# Patient Record
Sex: MALE | Race: WHITE | NOT HISPANIC OR LATINO | Employment: OTHER | URBAN - NONMETROPOLITAN AREA
[De-identification: names, ages, dates, MRNs, and addresses within clinical notes are randomized per-mention and may not be internally consistent; named-entity substitution may affect disease eponyms.]

---

## 2018-01-18 ENCOUNTER — OFFICE VISIT (OUTPATIENT)
Dept: SURGERY | Facility: CLINIC | Age: 68
End: 2018-01-18

## 2018-01-18 VITALS
DIASTOLIC BLOOD PRESSURE: 70 MMHG | BODY MASS INDEX: 34.56 KG/M2 | HEIGHT: 68 IN | WEIGHT: 228 LBS | HEART RATE: 68 BPM | SYSTOLIC BLOOD PRESSURE: 120 MMHG | OXYGEN SATURATION: 98 % | TEMPERATURE: 98.7 F

## 2018-01-18 DIAGNOSIS — R15.9 INCONTINENCE OF FECES, UNSPECIFIED FECAL INCONTINENCE TYPE: Primary | ICD-10-CM

## 2018-01-18 DIAGNOSIS — K64.9 HEMORRHOIDS, UNSPECIFIED HEMORRHOID TYPE: ICD-10-CM

## 2018-01-18 DIAGNOSIS — Z86.010 HX OF COLONIC POLYP: ICD-10-CM

## 2018-01-18 PROCEDURE — 99213 OFFICE O/P EST LOW 20 MIN: CPT | Performed by: SURGERY

## 2018-01-18 RX ORDER — RANITIDINE 150 MG/1
150 TABLET ORAL 2 TIMES DAILY PRN
Refills: 0 | COMMUNITY
Start: 2018-01-15 | End: 2020-06-17 | Stop reason: HOSPADM

## 2018-01-18 RX ORDER — LANSOPRAZOLE 15 MG/1
15 CAPSULE, DELAYED RELEASE ORAL DAILY
Refills: 0 | COMMUNITY
Start: 2017-10-26 | End: 2020-06-17 | Stop reason: HOSPADM

## 2018-01-18 RX ORDER — LISINOPRIL 20 MG/1
20 TABLET ORAL DAILY
Refills: 0 | COMMUNITY
Start: 2017-12-03

## 2018-01-18 RX ORDER — GLIPIZIDE 5 MG/1
2.5 TABLET, FILM COATED, EXTENDED RELEASE ORAL 2 TIMES DAILY
Refills: 0 | COMMUNITY
Start: 2017-10-25

## 2018-01-18 RX ORDER — ATORVASTATIN CALCIUM 40 MG/1
40 TABLET, FILM COATED ORAL DAILY
Refills: 0 | COMMUNITY
Start: 2017-10-25

## 2018-01-18 RX ORDER — NICOTINE POLACRILEX 2 MG/1
1 LOZENGE ORAL DAILY
Refills: 0 | COMMUNITY
Start: 2017-12-03 | End: 2022-04-18 | Stop reason: HOSPADM

## 2018-01-18 NOTE — PROGRESS NOTES
Patient: Miguelangel Lin    YOB: 1950    Date: 01/18/2018    Primary Care Provider: Belinda Bullock MD    Chief complaint:   Chief Complaint   Patient presents with   • FHx Colon Cancer     colonoscopy       Subjective .     History of present illness:  I saw the patient in the office today as a consultation for a hx of polyps.  Patient had a colonoscopy 12/2014, pathology showed x2 hyperplastic polyps. He complains of hemorrhoids and occasional fecal incontinence. He denies a fm hx of colon cancer or changes in bowel habits. He has never had a heart attack or a stroke.     The following portions of the patient's history were reviewed and updated as appropriate: allergies, current medications, past family history, past medical history, past social history, past surgical history and problem list.      Review of Systems   Constitutional: Negative for chills, diaphoresis, fatigue and fever.   HENT: Negative for dental problem, drooling, ear discharge and hearing loss.    Respiratory: Negative for cough, choking, chest tightness and shortness of breath.    Cardiovascular: Negative for chest pain, palpitations and leg swelling.   Gastrointestinal: Positive for rectal pain. Negative for blood in stool.   Endocrine: Negative for cold intolerance and heat intolerance.   Genitourinary: Negative for flank pain, frequency and hematuria.   Neurological: Negative for seizures, light-headedness, numbness and headaches.   Psychiatric/Behavioral: Negative for agitation, behavioral problems and confusion.       History:  Past Medical History:   Diagnosis Date   • Diabetes mellitus    • Hypertension        Past Surgical History:   Procedure Laterality Date   • HERNIA REPAIR         Family History   Problem Relation Age of Onset   • No Known Problems Mother    • No Known Problems Father    • No Known Problems Sister    • No Known Problems Brother        Social History   Substance Use Topics   • Smoking status:  Former Smoker   • Smokeless tobacco: Never Used   • Alcohol use No       Allergies:  No Known Allergies    Medications:    Current Outpatient Prescriptions:   •  atorvastatin (LIPITOR) 40 MG tablet, take 1 tablet by oral route  every day, Disp: , Rfl: 0  •  glipiZIDE (GLUCOTROL XL) 10 MG 24 hr tablet, take 1 tablet by mouth twice a day, Disp: , Rfl: 0  •  lansoprazole (PREVACID) 15 MG capsule, , Disp: , Rfl: 0  •  lisinopril (PRINIVIL,ZESTRIL) 20 MG tablet, , Disp: , Rfl: 0  •  metFORMIN (GLUCOPHAGE) 1000 MG tablet, take 1 tablet by mouth twice a day, Disp: , Rfl: 0  •  RA LORATA-D  MG per 24 hr tablet, , Disp: , Rfl: 0  •  raNITIdine (ZANTAC) 150 MG tablet, take 1 tablet by mouth twice a day if needed, Disp: , Rfl: 0    Objective     Vital Signs:   Temp:  [98.7 °F (37.1 °C)] 98.7 °F (37.1 °C)  Heart Rate:  [68] 68  BP: (120)/(70) 120/70    Physical Exam:   General Appearance:    Alert, cooperative, in no acute distress   Head:    Normocephalic, without obvious abnormality, atraumatic   Eyes:            Lids and lashes normal, conjunctivae and sclerae normal, no   icterus, no pallor, corneas clear, PERRL   Ears:    Ears appear intact with no abnormalities noted   Throat:   No oral lesions, no thrush, oral mucosa moist   Neck:   No adenopathy, supple, trachea midline, no thyromegaly,  no JVD   Lungs:     Clear to auscultation,respirations regular, even and                  unlabored    Heart:    Regular rhythm and normal rate, normal S1 and S2, no            murmur   Abdomen:     no masses, no organomegaly, soft non-tender, non-distended, no guarding, there is no evidence of tenderness   Extremities:   Moves all extremities well, no edema, no cyanosis, no             redness   Pulses:   Pulses palpable and equal bilaterally   Skin:   No bleeding, bruising or rash   Lymph nodes:   No palpable adenopathy   Neurologic:   Cranial nerves 2 - 12 grossly intact, sensation intact      Results Review:   I reviewed the  patient's new clinical results.  I reviewed the patient's new imaging results and agree with the interpretation.    Review of Systems was reviewed and confirmed as accurate today.    Assessment/Plan :    1. Incontinence of feces, unspecified fecal incontinence type    2. Hemorrhoids, unspecified hemorrhoid type    3. Hx of colonic polyp        I recommend a colonoscopy for further evaluation. The procedure was explained as well as the risks which include but are not limited to bleeding, infection, perforation, abdominal pain etc. The patient understands these risks and the procedure and wishes to proceed.     Electronically signed by Victoriano Lerma MD  01/18/18 1:23 PM    Scribed for Victoriano Lerma MD by Shireen Hernandez. 1/18/2018  1:39 PM

## 2018-02-01 ENCOUNTER — OUTSIDE FACILITY SERVICE (OUTPATIENT)
Dept: SURGERY | Facility: CLINIC | Age: 68
End: 2018-02-01

## 2018-02-01 PROCEDURE — 45384 COLONOSCOPY W/LESION REMOVAL: CPT | Performed by: SURGERY

## 2018-02-15 ENCOUNTER — OFFICE VISIT (OUTPATIENT)
Dept: SURGERY | Facility: CLINIC | Age: 68
End: 2018-02-15

## 2018-02-15 VITALS
DIASTOLIC BLOOD PRESSURE: 70 MMHG | HEART RATE: 70 BPM | SYSTOLIC BLOOD PRESSURE: 128 MMHG | TEMPERATURE: 98.4 F | OXYGEN SATURATION: 99 % | BODY MASS INDEX: 34.56 KG/M2 | HEIGHT: 68 IN | WEIGHT: 228 LBS

## 2018-02-15 DIAGNOSIS — R15.9 INCONTINENCE OF FECES, UNSPECIFIED FECAL INCONTINENCE TYPE: ICD-10-CM

## 2018-02-15 DIAGNOSIS — K63.5 HYPERPLASTIC COLONIC POLYP, UNSPECIFIED PART OF COLON: Primary | ICD-10-CM

## 2018-02-15 PROCEDURE — 99213 OFFICE O/P EST LOW 20 MIN: CPT | Performed by: SURGERY

## 2018-02-15 NOTE — PROGRESS NOTES
Patient: Miguelangel Lin    YOB: 1950    Date: 02/15/2018    Primary Care Provider: Belinda Bullock MD    Reason for Consultation: Follow-up colonoscopy    Chief Complaint:   Chief Complaint   Patient presents with   • Follow-up     follow up colon       History of present illness:  I saw the patient in the office today as a followup from their recent colonoscopy with polypectomy, the pathology report did show hyperplastic polyps. Patient continues to complain of fecal incontinence.       The following portions of the patient's history were reviewed and updated as appropriate: allergies, current medications, past family history, past medical history, past social history, past surgical history and problem list.      Review of Systems    Allergies:  No Known Allergies    Medications:    Current Outpatient Prescriptions:   •  atorvastatin (LIPITOR) 40 MG tablet, take 1 tablet by oral route  every day, Disp: , Rfl: 0  •  glipiZIDE (GLUCOTROL XL) 10 MG 24 hr tablet, take 1 tablet by mouth twice a day, Disp: , Rfl: 0  •  lansoprazole (PREVACID) 15 MG capsule, , Disp: , Rfl: 0  •  lisinopril (PRINIVIL,ZESTRIL) 20 MG tablet, , Disp: , Rfl: 0  •  metFORMIN (GLUCOPHAGE) 1000 MG tablet, take 1 tablet by mouth twice a day, Disp: , Rfl: 0  •  RA LORATA-D  MG per 24 hr tablet, , Disp: , Rfl: 0  •  raNITIdine (ZANTAC) 150 MG tablet, take 1 tablet by mouth twice a day if needed, Disp: , Rfl: 0    Vital Signs:  Temp:  [98.4 °F (36.9 °C)] 98.4 °F (36.9 °C)  Heart Rate:  [70] 70  BP: (128)/(70) 128/70    Physical Exam:   General Appearance:    Alert, cooperative, in no acute distress   Abdomen:     no masses, no organomegaly, soft non-tender, non-distended, no guarding, wounds are well healed, no evidence of recurrent hernia   Chest:      Clear to ausculation            Cor:     Regular rate and rhythm    Results Review:   I reviewed the patient's new clinical results.  I reviewed the patient's new imaging  results and agree with the interpretation.    Assessment / Plan:    1. Hyperplastic colonic polyp, unspecified part of colon    2. Incontinence of feces, unspecified fecal incontinence type        I did discuss the situation with the patient today in the office and they have done well from their recent colonoscopy with polypectomy.  I have released the patient back to normal activity.  I need to see the patient back in the office in 5 years and they will need to have repeat colonoscopy at that time. I have continued to recommend fiber, this seems to have helped his bowel movements some.    Electronically signed by Victoriano Lerma MD  02/15/18    Scribed for Victoriano Lerma MD by Shireen Hernandez. 2/15/2018  3:24 PM

## 2018-02-15 NOTE — PROGRESS NOTES
Patient: Miguelangel Lin    YOB: 1950    Date: 02/15/2018    Primary Care Provider: Belinda Bullock MD    Chief complaint:   Chief Complaint   Patient presents with   • Follow-up     follow up colon       Subjective .     History of present illness:  I saw the patient in the office today as a consultation for evaluation and treatment of ***    The following portions of the patient's history were reviewed and updated as appropriate: allergies, current medications, past family history, past medical history, past social history, past surgical history and problem list.      Review of Systems    History:  Past Medical History:   Diagnosis Date   • Diabetes mellitus    • Hypertension        Past Surgical History:   Procedure Laterality Date   • HERNIA REPAIR         Family History   Problem Relation Age of Onset   • No Known Problems Mother    • No Known Problems Father    • No Known Problems Sister    • No Known Problems Brother        Social History   Substance Use Topics   • Smoking status: Former Smoker   • Smokeless tobacco: Never Used   • Alcohol use No       Allergies:  No Known Allergies    Medications:    Current Outpatient Prescriptions:   •  atorvastatin (LIPITOR) 40 MG tablet, take 1 tablet by oral route  every day, Disp: , Rfl: 0  •  glipiZIDE (GLUCOTROL XL) 10 MG 24 hr tablet, take 1 tablet by mouth twice a day, Disp: , Rfl: 0  •  lansoprazole (PREVACID) 15 MG capsule, , Disp: , Rfl: 0  •  lisinopril (PRINIVIL,ZESTRIL) 20 MG tablet, , Disp: , Rfl: 0  •  metFORMIN (GLUCOPHAGE) 1000 MG tablet, take 1 tablet by mouth twice a day, Disp: , Rfl: 0  •  RA LORATA-D  MG per 24 hr tablet, , Disp: , Rfl: 0  •  raNITIdine (ZANTAC) 150 MG tablet, take 1 tablet by mouth twice a day if needed, Disp: , Rfl: 0    Objective     Vital Signs:   Temp:  [98.4 °F (36.9 °C)] 98.4 °F (36.9 °C)  Heart Rate:  [70] 70  BP: (128)/(70) 128/70    Physical Exam:   General Appearance:    Alert, cooperative, in no  "acute distress   Head:    Normocephalic, without obvious abnormality, atraumatic   Eyes:            Lids and lashes normal, conjunctivae and sclerae normal, no   icterus, no pallor, corneas clear, PERRL   Ears:    Ears appear intact with no abnormalities noted   Throat:   No oral lesions, no thrush, oral mucosa moist   Neck:   No adenopathy, supple, trachea midline, no thyromegaly,  no JVD   Lungs:     Clear to auscultation,respirations regular, even and                  unlabored    Heart:    Regular rhythm and normal rate, normal S1 and S2, no            murmur   Abdomen:     no masses, no organomegaly, soft non-tender, non-distended, no guarding, there is no evidence of tenderness   Extremities:   Moves all extremities well, no edema, no cyanosis, no             redness   Pulses:   Pulses palpable and equal bilaterally   Skin:   No bleeding, bruising or rash   Lymph nodes:   No palpable adenopathy   Neurologic:   Cranial nerves 2 - 12 grossly intact, sensation intact      Results Review:   {Results Review:03610::\"I reviewed the patient's new clinical results.\"}    Review of Systems was reviewed and confirmed as accurate today.    Assessment/Plan :    No diagnosis found.    I recommend a colonoscopy for further evaluation. The procedure was explained as well as the risks which include but are not limited to bleeding, infection, perforation, abdominal pain etc. The patient understands these risks and the procedure and wishes to proceed.     Electronically signed by Shireen Hernandez MA  02/15/18 3:02 PM    "

## 2020-03-12 ENCOUNTER — OFFICE VISIT (OUTPATIENT)
Dept: SURGERY | Facility: CLINIC | Age: 70
End: 2020-03-12

## 2020-03-12 VITALS
WEIGHT: 228.4 LBS | SYSTOLIC BLOOD PRESSURE: 138 MMHG | BODY MASS INDEX: 32.7 KG/M2 | HEIGHT: 70 IN | DIASTOLIC BLOOD PRESSURE: 94 MMHG | OXYGEN SATURATION: 98 % | HEART RATE: 84 BPM | TEMPERATURE: 98.5 F

## 2020-03-12 DIAGNOSIS — K63.5 POLYP OF COLON, UNSPECIFIED PART OF COLON, UNSPECIFIED TYPE: Primary | ICD-10-CM

## 2020-03-12 PROCEDURE — 99213 OFFICE O/P EST LOW 20 MIN: CPT | Performed by: SURGERY

## 2020-03-12 RX ORDER — POLYETHYLENE GLYCOL 3350 17 G/17G
238 POWDER, FOR SOLUTION ORAL ONCE
Qty: 1 EACH | Refills: 1 | Status: SHIPPED | OUTPATIENT
Start: 2020-03-12 | End: 2020-03-12

## 2020-03-12 RX ORDER — BISACODYL 5 MG/1
TABLET, DELAYED RELEASE ORAL
Qty: 4 TABLET | Refills: 0 | Status: SHIPPED | OUTPATIENT
Start: 2020-03-12

## 2020-03-12 NOTE — PROGRESS NOTES
Patient: Miguelangel Lin    YOB: 1950    Date: 03/12/2020    Primary Care Provider: Belinda Bullock MD    Chief Complaint   Patient presents with   • Colon Polyps       SUBJECTIVE:    History of present illness:  Patient has a history of MI and a history significant for hyperplastic colon polyps in the remote past. I saw the patient in the office today as a consultation for follow-up screening colonoscopy.  Patient denies changes in bowel habits, abdominal pain or rectal bleeding.  He does have a history significant for colon polyps.    The following portions of the patient's history were reviewed and updated as appropriate: allergies, current medications, past family history, past medical history, past social history, past surgical history and problem list.    Review of Systems   Constitutional: Negative for chills, fever and unexpected weight change.   HENT: Negative for trouble swallowing and voice change.    Eyes: Negative for visual disturbance.   Respiratory: Negative for apnea, cough, chest tightness, shortness of breath and wheezing.    Cardiovascular: Negative for chest pain, palpitations and leg swelling.   Gastrointestinal: Negative for abdominal distention, abdominal pain, anal bleeding, blood in stool, constipation, diarrhea, nausea, rectal pain and vomiting.   Endocrine: Negative for cold intolerance and heat intolerance.   Genitourinary: Negative for difficulty urinating, dysuria, flank pain, scrotal swelling and testicular pain.   Musculoskeletal: Negative for back pain, gait problem and joint swelling.   Skin: Negative for color change, rash and wound.   Neurological: Negative for dizziness, syncope, speech difficulty, weakness, numbness and headaches.   Hematological: Negative for adenopathy. Does not bruise/bleed easily.   Psychiatric/Behavioral: Negative for confusion. The patient is not nervous/anxious.        History:  Past Medical History:   Diagnosis Date   • Diabetes  "mellitus (CMS/HCC)    • Hypertension        Past Surgical History:   Procedure Laterality Date   • HERNIA REPAIR         Family History   Problem Relation Age of Onset   • No Known Problems Mother    • No Known Problems Father    • No Known Problems Sister    • No Known Problems Brother        Social History     Tobacco Use   • Smoking status: Former Smoker   • Smokeless tobacco: Never Used   Substance Use Topics   • Alcohol use: No   • Drug use: No       Allergies:  No Known Allergies    Medications:    Current Outpatient Medications:   •  atorvastatin (LIPITOR) 40 MG tablet, take 1 tablet by oral route  every day, Disp: , Rfl: 0  •  bisacodyl (DULCOLAX) 5 MG EC tablet, Take 2 @ 3pm, 2 @ 7 pm day prior to colonoscopy, Disp: 4 tablet, Rfl: 0  •  glipiZIDE (GLUCOTROL XL) 10 MG 24 hr tablet, take 1 tablet by mouth twice a day, Disp: , Rfl: 0  •  lansoprazole (PREVACID) 15 MG capsule, , Disp: , Rfl: 0  •  lisinopril (PRINIVIL,ZESTRIL) 20 MG tablet, , Disp: , Rfl: 0  •  metFORMIN (GLUCOPHAGE) 1000 MG tablet, take 1 tablet by mouth twice a day, Disp: , Rfl: 0  •  polyethylene glycol (GLYCOLAX) powder, Take 238 g by mouth 1 (One) Time for 1 dose. Mix with 64 ounces of clear liquid for bowel prep., Disp: 1 each, Rfl: 1  •  RA LORATA-D  MG per 24 hr tablet, , Disp: , Rfl: 0  •  raNITIdine (ZANTAC) 150 MG tablet, take 1 tablet by mouth twice a day if needed, Disp: , Rfl: 0    OBJECTIVE:    Vital Signs:   Vitals:    03/12/20 1421   BP: 138/94   Pulse: 84   Temp: 98.5 °F (36.9 °C)   SpO2: 98%   Weight: 104 kg (228 lb 6.4 oz)   Height: 177.8 cm (70\")       Physical Exam:   General Appearance:    Alert, cooperative, in no acute distress   Head:    Normocephalic, without obvious abnormality, atraumatic   Eyes:            Lids and lashes normal, conjunctivae and sclerae normal, no   icterus, no pallor, corneas clear, PERRL   Ears:    Ears appear intact with no abnormalities noted   Throat:   No oral lesions, no thrush, oral " mucosa moist   Neck:   No adenopathy, supple, trachea midline, no thyromegaly,  no JVD   Lungs:     Clear to auscultation,respirations regular, even and                  unlabored    Heart:    Regular rhythm and normal rate, normal S1 and S2, no            murmur   Abdomen:     no masses, no organomegaly, soft non-tender, non-distended, no guarding, there is no evidence of tenderness, no peritoneal signs   Extremities:   Moves all extremities well, no edema, no cyanosis, no             redness   Pulses:   Pulses palpable and equal bilaterally   Skin:   No bleeding, bruising or rash   Lymph nodes:   No palpable adenopathy   Neurologic:   Cranial nerves 2 - 12 grossly intact, sensation intact      Results Review:   I reviewed the patient's new clinical results.  I reviewed the patient's new imaging results and agree with the interpretation.  I reviewed the patient's other test results and agree with the interpretation    Review of Systems was reviewed and confirmed as accurate as documented by the MA.    ASSESSMENT/PLAN:    1. Polyp of colon, unspecified part of colon, unspecified type        I recommend a colonoscopy for further evaluation. The procedure was explained as well as the risks which include but are not limited to bleeding, infection, perforation, abdominal pain etc. The patient understands these risks and the procedure and wishes to proceed.     Electronically signed by Victoriano Lerma MD  03/12/20 9:12 AM  Portions of this note have been scribed for Victoriano Lerma MD by Shireen Hernandez. 3/12/2020  15:10

## 2020-03-20 ENCOUNTER — TELEPHONE (OUTPATIENT)
Dept: SURGERY | Facility: CLINIC | Age: 70
End: 2020-03-20

## 2020-04-28 PROBLEM — K63.5 POLYP OF COLON: Status: ACTIVE | Noted: 2020-04-28

## 2020-05-29 DIAGNOSIS — Z01.818 PRE-OP TESTING: Primary | ICD-10-CM

## 2020-06-15 ENCOUNTER — LAB (OUTPATIENT)
Dept: LAB | Facility: HOSPITAL | Age: 70
End: 2020-06-15

## 2020-06-15 DIAGNOSIS — Z01.818 PRE-OP TESTING: ICD-10-CM

## 2020-06-15 PROCEDURE — C9803 HOPD COVID-19 SPEC COLLECT: HCPCS

## 2020-06-15 PROCEDURE — U0002 COVID-19 LAB TEST NON-CDC: HCPCS

## 2020-06-15 PROCEDURE — U0004 COV-19 TEST NON-CDC HGH THRU: HCPCS

## 2020-06-15 RX ORDER — OMEPRAZOLE 20 MG/1
20 CAPSULE, DELAYED RELEASE ORAL DAILY
COMMUNITY

## 2020-06-15 RX ORDER — IPRATROPIUM BROMIDE 42 UG/1
2 SPRAY, METERED NASAL 2 TIMES DAILY
COMMUNITY

## 2020-06-15 RX ORDER — LORATADINE 10 MG/1
10 TABLET ORAL DAILY
COMMUNITY
End: 2022-04-04 | Stop reason: HOSPADM

## 2020-06-15 RX ORDER — NEOMYCIN SULFATE, POLYMYXIN B SULFATE, HYDROCORTISONE 3.5; 10000; 1 MG/ML; [USP'U]/ML; MG/ML
4 SOLUTION/ DROPS AURICULAR (OTIC) DAILY PRN
COMMUNITY
End: 2022-04-04 | Stop reason: HOSPADM

## 2020-06-15 RX ORDER — ASPIRIN 81 MG/1
81 TABLET ORAL 2 TIMES DAILY
COMMUNITY

## 2020-06-15 RX ORDER — AMOXICILLIN 500 MG
1200 CAPSULE ORAL DAILY
COMMUNITY

## 2020-06-15 RX ORDER — LANOLIN ALCOHOL/MO/W.PET/CERES
400 CREAM (GRAM) TOPICAL DAILY
COMMUNITY

## 2020-06-16 LAB
REF LAB TEST METHOD: NORMAL
SARS-COV-2 RNA RESP QL NAA+PROBE: NOT DETECTED

## 2020-06-17 ENCOUNTER — ANESTHESIA EVENT (OUTPATIENT)
Dept: GASTROENTEROLOGY | Facility: HOSPITAL | Age: 70
End: 2020-06-17

## 2020-06-17 ENCOUNTER — ANESTHESIA (OUTPATIENT)
Dept: GASTROENTEROLOGY | Facility: HOSPITAL | Age: 70
End: 2020-06-17

## 2020-06-17 ENCOUNTER — HOSPITAL ENCOUNTER (OUTPATIENT)
Facility: HOSPITAL | Age: 70
Setting detail: HOSPITAL OUTPATIENT SURGERY
Discharge: HOME | End: 2020-06-17
Attending: SURGERY | Admitting: SURGERY

## 2020-06-17 VITALS
OXYGEN SATURATION: 97 % | BODY MASS INDEX: 32.64 KG/M2 | HEART RATE: 80 BPM | DIASTOLIC BLOOD PRESSURE: 78 MMHG | RESPIRATION RATE: 20 BRPM | SYSTOLIC BLOOD PRESSURE: 102 MMHG | WEIGHT: 228 LBS | HEIGHT: 70 IN | TEMPERATURE: 97.6 F

## 2020-06-17 DIAGNOSIS — K63.5 POLYP OF COLON, UNSPECIFIED PART OF COLON, UNSPECIFIED TYPE: ICD-10-CM

## 2020-06-17 LAB — GLUCOSE BLDC GLUCOMTR-MCNC: 142 MG/DL (ref 70–130)

## 2020-06-17 PROCEDURE — 82962 GLUCOSE BLOOD TEST: CPT

## 2020-06-17 PROCEDURE — S0260 H&P FOR SURGERY: HCPCS | Performed by: SURGERY

## 2020-06-17 PROCEDURE — 25010000002 PROPOFOL 10 MG/ML EMULSION: Performed by: NURSE ANESTHETIST, CERTIFIED REGISTERED

## 2020-06-17 PROCEDURE — 88305 TISSUE EXAM BY PATHOLOGIST: CPT | Performed by: SURGERY

## 2020-06-17 RX ORDER — SODIUM CHLORIDE 0.9 % (FLUSH) 0.9 %
10 SYRINGE (ML) INJECTION AS NEEDED
Status: DISCONTINUED | OUTPATIENT
Start: 2020-06-17 | End: 2020-06-17 | Stop reason: HOSPADM

## 2020-06-17 RX ORDER — KETAMINE HCL IN NACL, ISO-OSM 100MG/10ML
SYRINGE (ML) INJECTION AS NEEDED
Status: DISCONTINUED | OUTPATIENT
Start: 2020-06-17 | End: 2020-06-17 | Stop reason: SURG

## 2020-06-17 RX ORDER — LIDOCAINE HYDROCHLORIDE 20 MG/ML
INJECTION, SOLUTION INTRAVENOUS AS NEEDED
Status: DISCONTINUED | OUTPATIENT
Start: 2020-06-17 | End: 2020-06-17 | Stop reason: SURG

## 2020-06-17 RX ORDER — PROPOFOL 10 MG/ML
VIAL (ML) INTRAVENOUS AS NEEDED
Status: DISCONTINUED | OUTPATIENT
Start: 2020-06-17 | End: 2020-06-17 | Stop reason: SURG

## 2020-06-17 RX ORDER — SODIUM CHLORIDE, SODIUM LACTATE, POTASSIUM CHLORIDE, CALCIUM CHLORIDE 600; 310; 30; 20 MG/100ML; MG/100ML; MG/100ML; MG/100ML
1000 INJECTION, SOLUTION INTRAVENOUS CONTINUOUS
Status: DISCONTINUED | OUTPATIENT
Start: 2020-06-17 | End: 2020-06-17 | Stop reason: HOSPADM

## 2020-06-17 RX ADMIN — Medication 30 MG: at 07:39

## 2020-06-17 RX ADMIN — PROPOFOL 50 MG: 10 INJECTION, EMULSION INTRAVENOUS at 07:39

## 2020-06-17 RX ADMIN — SODIUM CHLORIDE, POTASSIUM CHLORIDE, SODIUM LACTATE AND CALCIUM CHLORIDE 1000 ML: 600; 310; 30; 20 INJECTION, SOLUTION INTRAVENOUS at 06:35

## 2020-06-17 RX ADMIN — LIDOCAINE HYDROCHLORIDE 60 MG: 20 INJECTION, SOLUTION INTRAVENOUS at 07:39

## 2020-06-17 RX ADMIN — PROPOFOL 50 MG: 10 INJECTION, EMULSION INTRAVENOUS at 07:42

## 2020-06-17 RX ADMIN — PROPOFOL 50 MG: 10 INJECTION, EMULSION INTRAVENOUS at 07:55

## 2020-06-17 RX ADMIN — PROPOFOL 50 MG: 10 INJECTION, EMULSION INTRAVENOUS at 07:50

## 2020-06-17 NOTE — ANESTHESIA PREPROCEDURE EVALUATION
Anesthesia Evaluation     Patient summary reviewed and Nursing notes reviewed   no history of anesthetic complications:  NPO Solid Status: > 8 hours  NPO Liquid Status: > 8 hours           Airway   Mallampati: II  TM distance: >3 FB  Neck ROM: full  No difficulty expected  Dental - normal exam     Pulmonary - normal exam   (+) a smoker Former,     ROS comment: QUIT smoking 2006  Cardiovascular - normal exam  Exercise tolerance: good (4-7 METS)    PT is on anticoagulation therapy    (+) hypertension well controlled less than 2 medications, past MI ,     ROS comment: ASA 81 mg     Neuro/Psych- negative ROS  GI/Hepatic/Renal/Endo    (+)  GERD,  diabetes mellitus,     Musculoskeletal     Abdominal  - normal exam   Substance History - negative use  (-) alcohol use, drug use     OB/GYN negative ob/gyn ROS         Other   arthritis,      ROS/Med Hx Other: Polycythemia  [place] - uses bilateral hearing assistance  Hx of MI - Sees Dr Wright                  Anesthesia Plan    ASA 2     MAC   (Patient advised that intravenous sedation would be utilized as primary anesthetic technique. Every effort will be made to make sure patient is comfortable. Patient advised that they may experience recall of events of the procedure. Patient verbalized understanding and agreed to plan. )  intravenous induction     Anesthetic plan, all risks, benefits, and alternatives have been provided, discussed and informed consent has been obtained with: patient.    Plan discussed with CRNA.

## 2020-06-17 NOTE — H&P
Victoriano Lerma MD   Physician   General Surgery   Progress Notes   Signed   Encounter Date:  3/12/2020          Related encounter: Office Visit from 3/12/2020 in [facility] with Victoriano Lerma MD         Signed        Expand All Collapse All        Patient: Miguelangel Lin     YOB: 1950     Date: 06/17/2020     Primary Care Provider: Belinda Bullock MD         Chief Complaint   Patient presents with   • Colon Polyps         SUBJECTIVE:     History of present illness:  Patient has a history of MI and a history significant for hyperplastic colon polyps in the remote past. I saw the patient in the office today as a consultation for follow-up screening colonoscopy.  Patient denies changes in bowel habits, abdominal pain or rectal bleeding.  He does have a history significant for colon polyps.     The following portions of the patient's history were reviewed and updated as appropriate: allergies, current medications, past family history, past medical history, past social history, past surgical history and problem list.     Review of Systems   Constitutional: Negative for chills, fever and unexpected weight change.   HENT: Negative for trouble swallowing and voice change.    Eyes: Negative for visual disturbance.   Respiratory: Negative for apnea, cough, chest tightness, shortness of breath and wheezing.    Cardiovascular: Negative for chest pain, palpitations and leg swelling.   Gastrointestinal: Negative for abdominal distention, abdominal pain, anal bleeding, blood in stool, constipation, diarrhea, nausea, rectal pain and vomiting.   Endocrine: Negative for cold intolerance and heat intolerance.   Genitourinary: Negative for difficulty urinating, dysuria, flank pain, scrotal swelling and testicular pain.   Musculoskeletal: Negative for back pain, gait problem and joint swelling.   Skin: Negative for color change, rash and wound.   Neurological: Negative for  dizziness, syncope, speech difficulty, weakness, numbness and headaches.   Hematological: Negative for adenopathy. Does not bruise/bleed easily.   Psychiatric/Behavioral: Negative for confusion. The patient is not nervous/anxious.          History:  Medical History        Past Medical History:   Diagnosis Date   • Diabetes mellitus (CMS/HCC)     • Hypertension              Surgical History         Past Surgical History:   Procedure Laterality Date   • HERNIA REPAIR                      Family History   Problem Relation Age of Onset   • No Known Problems Mother     • No Known Problems Father     • No Known Problems Sister     • No Known Problems Brother           Social History           Tobacco Use   • Smoking status: Former Smoker   • Smokeless tobacco: Never Used   Substance Use Topics   • Alcohol use: No   • Drug use: No         Allergies:  No Known Allergies     Medications:     Current Outpatient Medications:   •  atorvastatin (LIPITOR) 40 MG tablet, take 1 tablet by oral route  every day, Disp: , Rfl: 0  •  bisacodyl (DULCOLAX) 5 MG EC tablet, Take 2 @ 3pm, 2 @ 7 pm day prior to colonoscopy, Disp: 4 tablet, Rfl: 0  •  glipiZIDE (GLUCOTROL XL) 10 MG 24 hr tablet, take 1 tablet by mouth twice a day, Disp: , Rfl: 0  •  lansoprazole (PREVACID) 15 MG capsule, , Disp: , Rfl: 0  •  lisinopril (PRINIVIL,ZESTRIL) 20 MG tablet, , Disp: , Rfl: 0  •  metFORMIN (GLUCOPHAGE) 1000 MG tablet, take 1 tablet by mouth twice a day, Disp: , Rfl: 0  •  polyethylene glycol (GLYCOLAX) powder, Take 238 g by mouth 1 (One) Time for 1 dose. Mix with 64 ounces of clear liquid for bowel prep., Disp: 1 each, Rfl: 1  •  RA LORATA-D  MG per 24 hr tablet, , Disp: , Rfl: 0  •  raNITIdine (ZANTAC) 150 MG tablet, take 1 tablet by mouth twice a day if needed, Disp: , Rfl: 0     OBJECTIVE:     Vital Signs:   Vitals       Vitals:     03/12/20 1421   BP: 138/94   Pulse: 84   Temp: 98.5 °F (36.9 °C)   SpO2: 98%   Weight: 104 kg (228 lb 6.4 oz)  "  Height: 177.8 cm (70\")            Physical Exam:           General Appearance:    Alert, cooperative, in no acute distress   Head:    Normocephalic, without obvious abnormality, atraumatic   Eyes:            Lids and lashes normal, conjunctivae and sclerae normal, no   icterus, no pallor, corneas clear, PERRL   Ears:    Ears appear intact with no abnormalities noted   Throat:   No oral lesions, no thrush, oral mucosa moist   Neck:   No adenopathy, supple, trachea midline, no thyromegaly,  no JVD   Lungs:     Clear to auscultation,respirations regular, even and                  unlabored    Heart:    Regular rhythm and normal rate, normal S1 and S2, no            murmur   Abdomen:     no masses, no organomegaly, soft non-tender, non-distended, no guarding, there is no evidence of tenderness, no peritoneal signs   Extremities:   Moves all extremities well, no edema, no cyanosis, no             redness   Pulses:   Pulses palpable and equal bilaterally   Skin:   No bleeding, bruising or rash   Lymph nodes:   No palpable adenopathy   Neurologic:   Cranial nerves 2 - 12 grossly intact, sensation intact       Results Review:              I reviewed the patient's new clinical results.  I reviewed the patient's new imaging results and agree with the interpretation.  I reviewed the patient's other test results and agree with the interpretation     Review of Systems was reviewed and confirmed as accurate as documented by the MA.     ASSESSMENT/PLAN:     1. Polyp of colon, unspecified part of colon, unspecified type          I recommend a colonoscopy for further evaluation. The procedure was explained as well as the risks which include but are not limited to bleeding, infection, perforation, abdominal pain etc. The patient understands these risks and the procedure and wishes to proceed.      Electronically signed by Victoriano Lerma MD  June 17, 2020                 Revision History                                      "

## 2020-06-17 NOTE — ANESTHESIA POSTPROCEDURE EVALUATION
Patient: Miguelangel Lin    Procedure Summary     Date:  06/17/20 Room / Location:  [facility] ENDOSCOPY 3 / [facility] ENDOSCOPY    Anesthesia Start:  0734 Anesthesia Stop:  0805    Procedure:  COLONOSCOPY, polypectomies (N/A ) Diagnosis:       Polyp of colon, unspecified part of colon, unspecified type      (Polyp of colon, unspecified part of colon, unspecified type [K63.5])    Surgeon:  Victoriano Lerma MD Provider:  Mauricio Toribio CRNA    Anesthesia Type:  MAC ASA Status:  2          Anesthesia Type: MAC    Vitals  Vitals Value Taken Time   BP 97/56 6/17/2020  8:08 AM   Temp 97.6 °F (36.4 °C) 6/17/2020  8:08 AM   Pulse 81 6/17/2020  8:08 AM   Resp 20 6/17/2020  8:08 AM   SpO2 95 % 6/17/2020  8:08 AM           Post Anesthesia Care and Evaluation    Patient location during evaluation: PHASE II  Patient participation: complete - patient participated  Level of consciousness: awake and sleepy but conscious  Pain management: adequate  Airway patency: patent  Anesthetic complications: No anesthetic complications  PONV Status: none  Cardiovascular status: acceptable and hemodynamically stable  Respiratory status: acceptable, room air, nonlabored ventilation and spontaneous ventilation  Hydration status: acceptable

## 2020-06-17 NOTE — DISCHARGE INSTRUCTIONS
Please follow all post op instructions and follow up appointment time from your physician's office included in your discharge packet.  .   No pushing, pulling, tugging,  heavy lifting, or strenuous activity.  No major decision making, driving, or drinking alcoholic beverages for 24 hours. ( due to the medications you have  received)  Always use good hand hygiene/washing techniques.  NO driving while taking pain medications.    * if you have an incision:  Check your incision area every day for signs of infection.   Check for:  * more redness, swelling, or pain  *more fluid or blood  *warmth  *pus or bad smell      To assist you in voiding:  Drink plenty of fluids  Listen to running water while attempting to void.    If you are unable to urinate and you have an uncomfortable urge to void or it has been   6 hours since you were discharged, return to the Emergency Room

## 2020-06-20 LAB
LAB AP CASE REPORT: NORMAL
PATH REPORT.FINAL DX SPEC: NORMAL

## 2022-03-25 ENCOUNTER — PREP FOR SURGERY (OUTPATIENT)
Dept: OTHER | Facility: HOSPITAL | Age: 72
End: 2022-03-25

## 2022-03-25 DIAGNOSIS — H25.12 NUCLEAR SCLEROTIC CATARACT OF LEFT EYE: Primary | ICD-10-CM

## 2022-03-25 RX ORDER — SODIUM CHLORIDE 0.9 % (FLUSH) 0.9 %
10 SYRINGE (ML) INJECTION EVERY 12 HOURS SCHEDULED
Status: CANCELLED | OUTPATIENT
Start: 2022-03-25

## 2022-03-25 RX ORDER — CYCLOPENT/TROPIC/PHEN/KETR/WAT 1%-1%-2.5%
1 DROPS (EA) OPHTHALMIC (EYE)
Status: CANCELLED | OUTPATIENT
Start: 2022-03-25 | End: 2022-03-25

## 2022-03-25 RX ORDER — TETRACAINE HYDROCHLORIDE 5 MG/ML
1 SOLUTION OPHTHALMIC SEE ADMIN INSTRUCTIONS
Status: CANCELLED | OUTPATIENT
Start: 2022-03-25

## 2022-03-25 RX ORDER — SODIUM CHLORIDE 0.9 % (FLUSH) 0.9 %
1-10 SYRINGE (ML) INJECTION AS NEEDED
Status: CANCELLED | OUTPATIENT
Start: 2022-03-25

## 2022-03-25 RX ORDER — PREDNISOLONE ACETATE 10 MG/ML
1 SUSPENSION/ DROPS OPHTHALMIC SEE ADMIN INSTRUCTIONS
Status: CANCELLED | OUTPATIENT
Start: 2022-03-25

## 2022-03-28 PROBLEM — H25.12 NUCLEAR SCLEROTIC CATARACT OF LEFT EYE: Status: ACTIVE | Noted: 2022-03-28

## 2022-04-01 RX ORDER — LANOLIN ALCOHOL/MO/W.PET/CERES
1000 CREAM (GRAM) TOPICAL DAILY
COMMUNITY

## 2022-04-01 NOTE — PRE-PROCEDURE INSTRUCTIONS
PAT phone history completed with pt for upcoming procedure on 4/4/22 with Dr. Kulkarni.      PAT PASS GIVEN/REVIEWED WITH PT.  VERBALIZED UNDERSTANDING OF THE FOLLOWING:  DO NOT EAT, DRINK, SMOKE, USE SMOKELESS TOBACCO OR CHEW GUM AFTER MIDNIGHT THE NIGHT BEFORE SURGERY.  THIS ALSO INCLUDES HARD CANDIES AND MINTS.    DO NOT SHAVE THE AREA TO BE OPERATED ON AT LEAST 48 HOURS PRIOR TO THE PROCEDURE.  DO NOT WEAR MAKE UP OR NAIL POLISH.  DO NOT LEAVE IN ANY PIERCING OR WEAR JEWELRY THE DAY OF SURGERY.      DO NOT USE ADHESIVES IF YOU WEAR DENTURES.    DO NOT WEAR EYE CONTACTS; BRING IN YOUR GLASSES.    ONLY TAKE MEDICATION THE MORNING OF YOUR PROCEDURE IF INSTRUCTED BY YOUR SURGEON WITH ENOUGH WATER TO SWALLOW THE MEDICATION.  IF YOUR SURGEON DID NOT SPECIFY WHICH MEDICATIONS TO TAKE, YOU WILL NEED TO CALL THEIR OFFICE FOR FURTHER INSTRUCTIONS AND DO AS THEY INSTRUCT.    LEAVE ANYTHING YOU CONSIDER VALUABLE AT HOME.    YOU WILL NEED TO ARRANGE FOR SOMEONE TO DRIVE YOU HOME AFTER SURGERY.  IT IS RECOMMENDED THAT YOU DO NOT DRIVE, WORK, DRINK ALCOHOL OR MAKE MAJOR DECISIONS FOR AT LEAST 24 HOURS AFTER YOUR PROCEDURE IS COMPLETE.      THE DAY OF YOUR PROCEDURE, BRING IN THE FOLLOWING IF APPLICABLE:   PICTURE ID AND INSURANCE/MEDICARE OR MEDICAID CARDS/ANY CO-PAY THAT MAY BE DUE   COPY OF ADVANCED DIRECTIVE/LIVING WILL/POWER OR    CPAP/BIPAP/INHALERS   SKIN PREP SHEET   YOUR PREADMISSION TESTING PASS (IF NOT A PHONE HISTORY)

## 2022-04-03 ENCOUNTER — ANESTHESIA EVENT (OUTPATIENT)
Dept: PERIOP | Facility: HOSPITAL | Age: 72
End: 2022-04-03

## 2022-04-03 NOTE — ANESTHESIA PREPROCEDURE EVALUATION
Anesthesia Evaluation     Patient summary reviewed and Nursing notes reviewed   no history of anesthetic complications:  NPO Solid Status: > 8 hours  NPO Liquid Status: > 8 hours           Airway   Mallampati: II  TM distance: >3 FB  Neck ROM: full  No difficulty expected and Possible difficult intubation  Dental - normal exam     Pulmonary    (+) a smoker Former, COPD, shortness of breath, sleep apnea, decreased breath sounds,     ROS comment: QUIT smoking 2006  Cardiovascular - normal exam  Exercise tolerance: good (4-7 METS)    PT is on anticoagulation therapy    (+) hypertension well controlled less than 2 medications, past MI , CAD ( inc risk ), angina, ANTONY, PVD,     ROS comment: ASA 81 mg     Neuro/Psych- negative ROS  GI/Hepatic/Renal/Endo    (+)  GERD,  diabetes mellitus type 2,     Musculoskeletal     (+) arthralgias, back pain, chronic pain, myalgias,   Abdominal   (+) obese,    Substance History - negative use  (-) alcohol use, drug use     OB/GYN negative ob/gyn ROS         Other   arthritis, blood dyscrasia,   history of cancer    ROS/Med Hx Other: Polycythemia  [place] - uses bilateral hearing assistance  Hx of MI - Sees Dr Wright                    Anesthesia Plan    ASA 3     MAC   (Patient advised that intravenous sedation would be utilized as primary anesthetic technique. Every effort will be made to make sure patient is comfortable. Patient advised that they may experience recall of events of the procedure. Patient verbalized understanding and agreed to plan. )  intravenous induction     Anesthetic plan, all risks, benefits, and alternatives have been provided, discussed and informed consent has been obtained with: patient.    Plan discussed with CRNA.

## 2022-04-04 ENCOUNTER — HOSPITAL ENCOUNTER (OUTPATIENT)
Facility: HOSPITAL | Age: 72
Setting detail: HOSPITAL OUTPATIENT SURGERY
Discharge: HOME | End: 2022-04-04
Attending: OPHTHALMOLOGY | Admitting: OPHTHALMOLOGY

## 2022-04-04 ENCOUNTER — ANESTHESIA (OUTPATIENT)
Dept: PERIOP | Facility: HOSPITAL | Age: 72
End: 2022-04-04

## 2022-04-04 VITALS
SYSTOLIC BLOOD PRESSURE: 104 MMHG | TEMPERATURE: 97.6 F | DIASTOLIC BLOOD PRESSURE: 71 MMHG | HEIGHT: 70 IN | HEART RATE: 72 BPM | WEIGHT: 208 LBS | OXYGEN SATURATION: 97 % | RESPIRATION RATE: 18 BRPM | BODY MASS INDEX: 29.78 KG/M2

## 2022-04-04 DIAGNOSIS — H25.12 NUCLEAR SCLEROTIC CATARACT OF LEFT EYE: ICD-10-CM

## 2022-04-04 LAB — GLUCOSE BLDC GLUCOMTR-MCNC: 119 MG/DL (ref 70–130)

## 2022-04-04 PROCEDURE — 82962 GLUCOSE BLOOD TEST: CPT

## 2022-04-04 PROCEDURE — 25010000002 MIDAZOLAM PER 1MG: Performed by: NURSE ANESTHETIST, CERTIFIED REGISTERED

## 2022-04-04 PROCEDURE — 25010000002 PROPOFOL 10 MG/ML EMULSION: Performed by: NURSE ANESTHETIST, CERTIFIED REGISTERED

## 2022-04-04 PROCEDURE — V2632 POST CHMBR INTRAOCULAR LENS: HCPCS | Performed by: OPHTHALMOLOGY

## 2022-04-04 DEVICE — IMPLANTABLE DEVICE: Type: IMPLANTABLE DEVICE | Site: POSTERIOR CHAMBER | Status: FUNCTIONAL

## 2022-04-04 RX ORDER — SODIUM CHLORIDE 0.9 % (FLUSH) 0.9 %
1-10 SYRINGE (ML) INJECTION AS NEEDED
Status: DISCONTINUED | OUTPATIENT
Start: 2022-04-04 | End: 2022-04-04 | Stop reason: HOSPADM

## 2022-04-04 RX ORDER — SODIUM CHLORIDE 0.9 % (FLUSH) 0.9 %
10 SYRINGE (ML) INJECTION EVERY 12 HOURS SCHEDULED
Status: DISCONTINUED | OUTPATIENT
Start: 2022-04-04 | End: 2022-04-04 | Stop reason: HOSPADM

## 2022-04-04 RX ORDER — BALANCED SALT SOLUTION 6.4; .75; .48; .3; 3.9; 1.7 MG/ML; MG/ML; MG/ML; MG/ML; MG/ML; MG/ML
SOLUTION OPHTHALMIC AS NEEDED
Status: DISCONTINUED | OUTPATIENT
Start: 2022-04-04 | End: 2022-04-04 | Stop reason: HOSPADM

## 2022-04-04 RX ORDER — SODIUM CHLORIDE, SODIUM LACTATE, POTASSIUM CHLORIDE, CALCIUM CHLORIDE 600; 310; 30; 20 MG/100ML; MG/100ML; MG/100ML; MG/100ML
INJECTION, SOLUTION INTRAVENOUS CONTINUOUS PRN
Status: DISCONTINUED | OUTPATIENT
Start: 2022-04-04 | End: 2022-04-04 | Stop reason: SURG

## 2022-04-04 RX ORDER — LIDOCAINE HYDROCHLORIDE 40 MG/ML
INJECTION, SOLUTION RETROBULBAR; TOPICAL AS NEEDED
Status: DISCONTINUED | OUTPATIENT
Start: 2022-04-04 | End: 2022-04-04 | Stop reason: HOSPADM

## 2022-04-04 RX ORDER — NEOMYCIN SULFATE, POLYMYXIN B SULFATE, AND DEXAMETHASONE 3.5; 10000; 1 MG/G; [USP'U]/G; MG/G
OINTMENT OPHTHALMIC AS NEEDED
Status: DISCONTINUED | OUTPATIENT
Start: 2022-04-04 | End: 2022-04-04 | Stop reason: HOSPADM

## 2022-04-04 RX ORDER — CYCLOPENT/TROPIC/PHEN/KETR/WAT 1%-1%-2.5%
1 DROPS (EA) OPHTHALMIC (EYE)
Status: COMPLETED | OUTPATIENT
Start: 2022-04-04 | End: 2022-04-04

## 2022-04-04 RX ORDER — SODIUM CHLORIDE, SODIUM LACTATE, POTASSIUM CHLORIDE, CALCIUM CHLORIDE 600; 310; 30; 20 MG/100ML; MG/100ML; MG/100ML; MG/100ML
1000 INJECTION, SOLUTION INTRAVENOUS CONTINUOUS
Status: DISCONTINUED | OUTPATIENT
Start: 2022-04-04 | End: 2022-04-04 | Stop reason: HOSPADM

## 2022-04-04 RX ORDER — MIDAZOLAM HYDROCHLORIDE 2 MG/2ML
INJECTION, SOLUTION INTRAMUSCULAR; INTRAVENOUS AS NEEDED
Status: DISCONTINUED | OUTPATIENT
Start: 2022-04-04 | End: 2022-04-04 | Stop reason: SURG

## 2022-04-04 RX ORDER — PREDNISOLONE ACETATE 10 MG/ML
1 SUSPENSION/ DROPS OPHTHALMIC SEE ADMIN INSTRUCTIONS
Status: DISCONTINUED | OUTPATIENT
Start: 2022-04-04 | End: 2022-04-04 | Stop reason: HOSPADM

## 2022-04-04 RX ORDER — PROPOFOL 10 MG/ML
VIAL (ML) INTRAVENOUS AS NEEDED
Status: DISCONTINUED | OUTPATIENT
Start: 2022-04-04 | End: 2022-04-04 | Stop reason: SURG

## 2022-04-04 RX ORDER — KETAMINE HCL IN NACL, ISO-OSM 100MG/10ML
SYRINGE (ML) INJECTION AS NEEDED
Status: DISCONTINUED | OUTPATIENT
Start: 2022-04-04 | End: 2022-04-04 | Stop reason: SURG

## 2022-04-04 RX ORDER — PREDNISOLONE ACETATE 10 MG/ML
SUSPENSION/ DROPS OPHTHALMIC AS NEEDED
Status: DISCONTINUED | OUTPATIENT
Start: 2022-04-04 | End: 2022-04-04 | Stop reason: HOSPADM

## 2022-04-04 RX ORDER — TETRACAINE HYDROCHLORIDE 5 MG/ML
1 SOLUTION OPHTHALMIC SEE ADMIN INSTRUCTIONS
Status: COMPLETED | OUTPATIENT
Start: 2022-04-04 | End: 2022-04-04

## 2022-04-04 RX ORDER — TETRACAINE HYDROCHLORIDE 5 MG/ML
SOLUTION OPHTHALMIC AS NEEDED
Status: DISCONTINUED | OUTPATIENT
Start: 2022-04-04 | End: 2022-04-04 | Stop reason: HOSPADM

## 2022-04-04 RX ORDER — ACETAZOLAMIDE 500 MG/1
500 CAPSULE, EXTENDED RELEASE ORAL ONCE
Status: COMPLETED | OUTPATIENT
Start: 2022-04-04 | End: 2022-04-04

## 2022-04-04 RX ADMIN — SODIUM CHLORIDE, POTASSIUM CHLORIDE, SODIUM LACTATE AND CALCIUM CHLORIDE: 600; 310; 30; 20 INJECTION, SOLUTION INTRAVENOUS at 08:13

## 2022-04-04 RX ADMIN — PROPOFOL 20 MG: 10 INJECTION, EMULSION INTRAVENOUS at 08:23

## 2022-04-04 RX ADMIN — SODIUM CHLORIDE, POTASSIUM CHLORIDE, SODIUM LACTATE AND CALCIUM CHLORIDE 1000 ML: 600; 310; 30; 20 INJECTION, SOLUTION INTRAVENOUS at 06:38

## 2022-04-04 RX ADMIN — Medication 1 DROP: at 06:29

## 2022-04-04 RX ADMIN — MIDAZOLAM HYDROCHLORIDE 2 MG: 1 INJECTION, SOLUTION INTRAMUSCULAR; INTRAVENOUS at 08:11

## 2022-04-04 RX ADMIN — TETRACAINE HYDROCHLORIDE 1 DROP: 5 SOLUTION OPHTHALMIC at 06:27

## 2022-04-04 RX ADMIN — TETRACAINE HYDROCHLORIDE 1 DROP: 5 SOLUTION OPHTHALMIC at 06:28

## 2022-04-04 RX ADMIN — PROPOFOL 20 MG: 10 INJECTION, EMULSION INTRAVENOUS at 08:28

## 2022-04-04 RX ADMIN — ACETAZOLAMIDE 500 MG: 500 CAPSULE, EXTENDED RELEASE ORAL at 08:55

## 2022-04-04 RX ADMIN — Medication 1 DROP: at 06:34

## 2022-04-04 RX ADMIN — Medication 15 MG: at 08:20

## 2022-04-04 RX ADMIN — Medication 1 DROP: at 06:39

## 2022-04-04 RX ADMIN — PROPOFOL 20 MG: 10 INJECTION, EMULSION INTRAVENOUS at 08:21

## 2022-04-04 NOTE — ANESTHESIA POSTPROCEDURE EVALUATION
Patient: Miguelangel Lin    Procedure Summary     Date: 04/04/22 Room / Location: [facility] OR 1 /  [facility]    Anesthesia Start: 0816 Anesthesia Stop:     Procedure: CATARACT PHACO EXTRACTION WITH INTRAOCULAR LENS IMPLANT LEFT (Left Eye) Diagnosis:       Nuclear sclerotic cataract of left eye      (Nuclear sclerotic cataract of left eye [H25.12])    Surgeons: Khang Kulkarni MD Provider: Ben Kumar CRNA    Anesthesia Type: MAC ASA Status: 3          Anesthesia Type: MAC    Vitals  No vitals data found for the desired time range.          Post Anesthesia Care and Evaluation    Patient location during evaluation: PHASE II  Patient participation: complete - patient participated  Level of consciousness: awake  Pain score: 0  Pain management: adequate  Airway patency: patent  Anesthetic complications: No anesthetic complications  PONV Status: none  Cardiovascular status: acceptable  Respiratory status: acceptable and nasal cannula  Hydration status: acceptable    Comments: vsss resp spont, reflexes intact, responsive, report given to pacu nurse.  See rn note for postop vs

## 2022-04-04 NOTE — H&P
"      [facility]         History and Physical    Patient Name: Miguelangel Lin  MRN: [id number]  : 1950  Gender: male     HPI: Patient complaint of PPLOV Left eye diagnosed with cataract. Patient requests PHACO PCIOL for Increase of VA/ADL.    History:    Past Medical History:   Diagnosis Date   • Arthritis    • Cataracts, bilateral     no surgery yet   • Chest pain     has stress test and pt states was WNL   • COVID-19 vaccine series completed     Moderna, plus booster   • Diabetes mellitus (HCC)    • Enlarged prostate    • GERD (gastroesophageal reflux disease)    • [place] (hard of hearing)     bilateral hearing aids   • Hx of exercise stress test     \"it was fine\"   • Hypertension    • MI (myocardial infarction) ([facility])     Saw Dr. Wright.  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    • Seasonal allergies    • Wears glasses        Past Surgical History:   Procedure Laterality Date   • COLONOSCOPY     • COLONOSCOPY N/A 2020    Procedure: COLONOSCOPY, polypectomies;  Surgeon: Victoriano Lerma MD;  Location: [facility] ENDOSCOPY;  Service: Gastroenterology;  Laterality: N/A;   • ENDOSCOPY     • HERNIA REPAIR      x2   • SKIN BIOPSY      right hand--pre-cancerous   • TONSILLECTOMY         Social History     Socioeconomic History   • Marital status: Unknown   Tobacco Use   • Smoking status: Former Smoker     Years: 25.00     Types: Pipe     Quit date: 6/15/2006     Years since quitting: 15.8   • Smokeless tobacco: Never Used   Vaping Use   • Vaping Use: Never used   Substance and Sexual Activity   • Alcohol use: No   • Drug use: No   • Sexual activity: Defer       Family History   Problem Relation Age of Onset   • No Known Problems Mother    • No Known Problems Father    • No Known Problems Sister    • No Known Problems Brother "        Prior to Admission Medications:  Medications Prior to Admission   Medication Sig Dispense Refill Last Dose   • Apoaequorin (PREVAGEN PO) Take 1 tablet by mouth Daily.   4/3/2022 at 0900   • Ascorbic Acid (VITAMIN C PO) Take 21 mg by mouth Daily.   4/3/2022 at 0900   • aspirin 81 MG EC tablet Take 81 mg by mouth 2 (Two) Times a Day.   4/3/2022 at 2100   • atorvastatin (LIPITOR) 40 MG tablet Take 40 mg by mouth Daily.  0 4/3/2022 at 0900   • Calcium Carbonate Antacid (TUMS PO) Take 2 tablets by mouth Take As Directed.   4/3/2022 at 2100   • Calcium Carbonate-Vit D-Min (CALCIUM 1200 PO) Take 1 tablet by mouth Daily.   4/3/2022 at 0900   • empagliflozin (JARDIANCE) 25 MG tablet tablet Take 25 mg by mouth Daily.   4/3/2022 at 0900   • folic acid (FOLVITE) 400 MCG tablet Take 400 mcg by mouth Daily.   4/3/2022 at 2100   • glipizide (GLUCOTROL XL) 5 MG ER tablet Take 2.5 mg by mouth 2 (two) times a day.  0 4/3/2022 at 2100   • ipratropium (ATROVENT) 0.06 % nasal spray 2 sprays into the nostril(s) as directed by provider 2 (two) times a day.   4/3/2022 at 2100   • lisinopril (PRINIVIL,ZESTRIL) 20 MG tablet Take 20 mg by mouth Daily.  0 4/3/2022 at 0900   • metFORMIN (GLUCOPHAGE) 1000 MG tablet Take 1,000 mg by mouth 2 (Two) Times a Day With Meals.  0 4/3/2022 at 2100   • Naproxen Sod-diphenhydrAMINE (ALEVE PM PO) Take 1 tablet by mouth At Night As Needed.   Past Week at Unknown time   • Omega-3 Fatty Acids (FISH OIL) 1200 MG capsule capsule Take 1,200 mg by mouth Daily.   4/3/2022 at 0900   • omeprazole (priLOSEC) 20 MG capsule Take 20 mg by mouth Daily.   4/3/2022 at 2100   • RA LORATA-D  MG per 24 hr tablet Take 1 tablet by mouth Daily.  0 4/3/2022 at 0900   • SAXagliptin (ONGLYZA) 5 MG tablet Take 5 mg by mouth Daily.   4/3/2022 at 2100   • vitamin B-12 (CYANOCOBALAMIN) 1000 MCG tablet Take 1,000 mcg by mouth Daily.   4/3/2022 at 0900   • bisacodyl (DULCOLAX) 5 MG EC tablet Take 2 @ 3pm, 2 @ 7 pm day prior  "to colonoscopy 4 tablet 0 Unknown at Unknown time   • loratadine (CLARITIN) 10 MG tablet Take 10 mg by mouth Daily.      • neomycin-polymyxin-hydrocortisone (CORTISPORIN) 1 % solution otic solution Administer 4 drops into both ears Daily As Needed.      • psyllium (METAMUCIL) 58.6 % packet Take 1 packet by mouth Daily As Needed.   More than a month at Unknown time       Allergies:  Allergies   Allergen Reactions   • Doxycycline Other (See Comments)     \"tingling\", feverish, headache        Vitals: Temp:  [97.1 °F (36.2 °C)] 97.1 °F (36.2 °C)  Resp:  [18] 18  BP: (117)/(75) 117/75    Review of Systems:   Within Normal Limits Abnormal   HEENT [x]    []     Cardiovascular [x]   []     Gastrointestinal [x]   []     Genitourinary [x]   []     Neurologic [x]   []     Pulmonary [x]   []       Physical Exam:   Within Normal Limits Abnormal   HEENT [x]    []     Heart [x]   []     Lungs [x]   []     Abdomen [x]   []     Extremities [x]   []       Impression: Left nuclear sclerotic cataract.     Plan: CATARACT PHACO EXTRACTION WITH INTRAOCULAR LENS IMPLANT LEFT (Left)     Khang Kulkarni MD  4/4/2022    "

## 2022-04-04 NOTE — OP NOTE
OPERATIVE NOTE    Date of Procedure: 4/4/2022  Patient Name: Miguelangel Lin  Patient MRN: [id number]  YOB: 1950     Preoperative Diagnosis: Left nuclear sclerotic cataract.     Postoperative Diagnosis: Left nuclear sclerotic cataract.     Procedure Performed: Phacoemulsification with implantation of a  foldable posterior chamber intraocular lens, Left eye.     Surgeon: Khang Kulkarni MD     Anesthesia:  Monitored Anesthesia Care (MAC)      Brief History and Indication: The patient presents with a history of past progressive loss of vision.  Patient was diagnosed with cataract and requests removal for increased ability to read and see.     Operation Description: The patient was taken to the OR and prepped and draped in the usual sterile ophthalmic fashion. A lid speculum was placed in the eye.  A #75 blade was then used to make a stab incision two o’clock hours from the intended temporal clear cornea groove. The anterior chamber was then inflated with a Viscoelastic. A metal microkeratome blade was then used to enter the anterior chamber at the temporal clear cornea site. A three level tunnel incision was made. A curvilinear capsulorrhexis was then performed with a bent cystotome needle and capsulorrhexis forceps.  BSS on a 30 gauge bent cannula was used to hydro-dissect, and hydro-delineate the lens. Good fluid waves and hydro-delineation were noted. Phacoemulsification was then used to remove nuclear material without complications. The residual cortical and lenticular material was then removed with irrigation and aspiration. Viscoelastics were then used to inflate the bag in a soft shell technique. A PCIOL was injected into the bag. Post-implantation, there were no rents or tears in the bag and the lens was noted to be stable and centered. The residual Viscoelastic was then removed with irrigation and aspiration.  The wound was checked and found to be without leaks. Therefore a Polydex ointment and  one drop of a Prednisilone eye drop was placed in the eye.     Implant Information:   Implant Name Type Inv. Item Serial No.  Lot No. LRB No. Used Action   LENS ACRYSOF IQ SA60WF W/ULTRASERT 6X13MM ACU0T0 15 - [id number] - [id number] Implant LENS ACRYSOF IQ SA60WF W/ULTRASERT 6X13MM ACU0T0 15 [id number] Left 1 Implanted       Complications: None    Estimated Blood Loss:  Less than 1 cc.       []   Changed to complex procedure due to: []  hypermature, white cataract. Blue dye was used. []   small iris. A Malyugin Ring was used.    Discharge and Condition  The patient was transported to same day surgery in excellent condition and scheduled for follow-up tomorrow morning. The patient was given instructions on use of eye drops for the operative eye and was specifically instructed to call Dr. Kulkarni at his office or home for any nausea, vomiting, headache, decreased visual acuity, or pain, or if the patient had any general concerns.    Khang Kulkarni MD  4/4/2022

## 2022-04-04 NOTE — DISCHARGE INSTRUCTIONS
Post Operative Cataract Instructions     Left Eye  POST OPERATIVE INSTRUCTIONS  You have received anesthesia today. DO NOT drive, drink alcohol, sign legal documents.   After surgery, your eye will not hurt. It may feel scratchy, sticky or uncomfortable. Your eye will be sensitive to light.  Most people see better 1-3 days after the procedure, but it could take 3 weeks to get the full benefits and reach your visual potential. If your vision is blurry for a few days it is normal, and means you may have swelling outside or inside the eye. For some patients, a bubble is placed and there will be blurriness.   You should receive a post-op kit with tape and an eye shield. Wear the shield for the first 3 nights after surgery to keep you from rubbing the eye.  You may wear glasses or sunglasses during the day.  You must keep eye protected at all times.  Most people are able to return to their normal routine 1-3 days after surgery, however, due to the brain adjusting to your new vision you may have trouble judging distances and want to be careful when driving and going up and downstairs.   You can shower and wash your hair the day after surgery. Keep water, shampoo, hair spray and shaving lotion out of the eye, especially for the first week.   If eyes become stuck together after surgery you may soak with warm cloth.  During the first week, you should AVOID:   Rubbing or putting pressure on your eye.  Eye make-up, face cream or lotion, hair coloring or perms  Strenuous activities, such as running or lifting weights, as to avoid sweat from getting in the eye. Avoid swimming, hot tubs, fumes or michelle conditions.   Sleeping on operative side.  Excessive sneezing or coughing.  Hanging the head down for periods of time. Keep your head above your heart.    Some discomfort and blurred vision after surgery are normal, but if you have any unusual pain, swelling, bleeding or sudden decrease in vision, contact our office immediately.      Emergency assistance is available at any time by calling:    Dr.Mark Shad Kulkarni  [phone]    If unable to reach call [facility] @ [phone]    You have been prescribed an eye drop to use after surgery, please follow these instructions and bring eye drops and instructions with you to post op appointment:    Prednisolone Acetate: Shake well before use    USE 1 DROP 4 (FOUR) TIMES A DAY FOR 1 WEEK THEN STARTING WEEK 2, ONLY USE 2 (TWO) TIMES A DAY UNTIL YOU RUN OUT OF DROPS.     PLACE A AZRA IN THE DAY COLUMN EACH TIME YOU USE TO KEEP ON SCHEDULE    WEEK 1-USE 4  (FOUR) TIMES A DAY DAY 1  []   []    []   []     DAY 2  []   []    []   []  DAY 3  []   []    []   []  DAY 4  []   []    []   []  DAY 5  []   []    []   []  DAY 6  []   []    []   []  DAY 7  []   []    []   []      WEEK 2-USE 2 (TWO)  TIMES A DAY DAY 1  []   []  DAY 2  []   []  DAY 3  []   []  DAY 4  []   []  DAY 5  []   []  DAY 6  []   []  DAY 7  []   []      WEEK 3-USE 2 (TWO) TIMES A DAY DAY 1  []   []  DAY 2  []   []  DAY 3  []   []  DAY 4  []   []  DAY 5  []   []  DAY 6  []   []  DAY 7  []   []      WEEK 4-USE 2 (TWO)TIMES A DAY DAY 1  []   []  DAY 2  []   []  DAY 3  []   []  DAY 4  []   []  DAY 5  []   []  DAY 6  []   []  DAY 7  []   []       Please follow all post op instructions and follow up appointment time from your physician's office included in your discharge packet.  .  No pushing, pulling, tugging,  heavy lifting, or strenuous activity.  No major decision making, driving, or drinking alcoholic beverages for 24 hours. ( due to the medications you have  received)  Always use good hand hygiene/washing techniques.  NO driving while taking pain medications.    * if you have an incision:  Check your incision area every day for signs of infection.   Check for:  * more redness, swelling, or pain  *more fluid or blood  *warmth  *pus or bad smell     To assist you  in voiding:  Drink plenty of fluids  Listen to running water while attempting to void.    If you are unable to urinate and you have an uncomfortable urge to void or it has been   6 hours since you were discharged, return to the Emergency Room

## 2022-04-06 ENCOUNTER — PREP FOR SURGERY (OUTPATIENT)
Dept: OTHER | Facility: HOSPITAL | Age: 72
End: 2022-04-06

## 2022-04-06 DIAGNOSIS — H25.11 NUCLEAR SCLEROTIC CATARACT OF RIGHT EYE: Primary | ICD-10-CM

## 2022-04-06 RX ORDER — CYCLOPENT/TROPIC/PHEN/KETR/WAT 1%-1%-2.5%
1 DROPS (EA) OPHTHALMIC (EYE)
Status: CANCELLED | OUTPATIENT
Start: 2022-04-06 | End: 2022-04-06

## 2022-04-06 RX ORDER — SODIUM CHLORIDE 0.9 % (FLUSH) 0.9 %
1-10 SYRINGE (ML) INJECTION AS NEEDED
Status: CANCELLED | OUTPATIENT
Start: 2022-04-06

## 2022-04-06 RX ORDER — SODIUM CHLORIDE 0.9 % (FLUSH) 0.9 %
10 SYRINGE (ML) INJECTION EVERY 12 HOURS SCHEDULED
Status: CANCELLED | OUTPATIENT
Start: 2022-04-06

## 2022-04-06 RX ORDER — PREDNISOLONE ACETATE 10 MG/ML
1 SUSPENSION/ DROPS OPHTHALMIC SEE ADMIN INSTRUCTIONS
Status: CANCELLED | OUTPATIENT
Start: 2022-04-06

## 2022-04-06 RX ORDER — TETRACAINE HYDROCHLORIDE 5 MG/ML
1 SOLUTION OPHTHALMIC SEE ADMIN INSTRUCTIONS
Status: CANCELLED | OUTPATIENT
Start: 2022-04-06

## 2022-04-15 RX ORDER — LORATADINE 10 MG/1
10 TABLET ORAL DAILY
COMMUNITY

## 2022-04-15 NOTE — PRE-PROCEDURE INSTRUCTIONS
PAT phone history completed with pt for upcoming procedure on 4/18/22 with Shad.      PAT PASS GIVEN/REVIEWED WITH PT.  VERBALIZED UNDERSTANDING OF THE FOLLOWING:  DO NOT EAT, DRINK, SMOKE, USE SMOKELESS TOBACCO OR CHEW GUM AFTER MIDNIGHT THE NIGHT BEFORE SURGERY.  THIS ALSO INCLUDES HARD CANDIES AND MINTS.    DO NOT SHAVE THE AREA TO BE OPERATED ON AT LEAST 48 HOURS PRIOR TO THE PROCEDURE.  DO NOT WEAR MAKE UP OR NAIL POLISH.  DO NOT LEAVE IN ANY PIERCING OR WEAR JEWELRY THE DAY OF SURGERY.      DO NOT USE ADHESIVES IF YOU WEAR DENTURES.    DO NOT WEAR EYE CONTACTS; BRING IN YOUR GLASSES.    ONLY TAKE MEDICATION THE MORNING OF YOUR PROCEDURE IF INSTRUCTED BY YOUR SURGEON WITH ENOUGH WATER TO SWALLOW THE MEDICATION.  IF YOUR SURGEON DID NOT SPECIFY WHICH MEDICATIONS TO TAKE, YOU WILL NEED TO CALL THEIR OFFICE FOR FURTHER INSTRUCTIONS AND DO AS THEY INSTRUCT.    LEAVE ANYTHING YOU CONSIDER VALUABLE AT HOME.    YOU WILL NEED TO ARRANGE FOR SOMEONE TO DRIVE YOU HOME AFTER SURGERY.  IT IS RECOMMENDED THAT YOU DO NOT DRIVE, WORK, DRINK ALCOHOL OR MAKE MAJOR DECISIONS FOR AT LEAST 24 HOURS AFTER YOUR PROCEDURE IS COMPLETE.      THE DAY OF YOUR PROCEDURE, BRING IN THE FOLLOWING IF APPLICABLE:   PICTURE ID AND INSURANCE/MEDICARE OR MEDICAID CARDS/ANY CO-PAY THAT MAY BE DUE   COPY OF ADVANCED DIRECTIVE/LIVING WILL/POWER OR    CPAP/BIPAP/INHALERS   SKIN PREP SHEET   YOUR PREADMISSION TESTING PASS (IF NOT A PHONE HISTORY)

## 2022-04-18 ENCOUNTER — ANESTHESIA (OUTPATIENT)
Dept: PERIOP | Facility: HOSPITAL | Age: 72
End: 2022-04-18

## 2022-04-18 ENCOUNTER — HOSPITAL ENCOUNTER (OUTPATIENT)
Facility: HOSPITAL | Age: 72
Setting detail: HOSPITAL OUTPATIENT SURGERY
Discharge: HOME | End: 2022-04-18
Attending: OPHTHALMOLOGY | Admitting: OPHTHALMOLOGY

## 2022-04-18 ENCOUNTER — ANESTHESIA EVENT (OUTPATIENT)
Dept: PERIOP | Facility: HOSPITAL | Age: 72
End: 2022-04-18

## 2022-04-18 VITALS
SYSTOLIC BLOOD PRESSURE: 115 MMHG | BODY MASS INDEX: 29.78 KG/M2 | RESPIRATION RATE: 18 BRPM | DIASTOLIC BLOOD PRESSURE: 72 MMHG | HEART RATE: 85 BPM | OXYGEN SATURATION: 96 % | WEIGHT: 208 LBS | TEMPERATURE: 97.6 F | HEIGHT: 70 IN

## 2022-04-18 DIAGNOSIS — H25.11 NUCLEAR SCLEROTIC CATARACT OF RIGHT EYE: ICD-10-CM

## 2022-04-18 LAB — GLUCOSE BLDC GLUCOMTR-MCNC: 147 MG/DL (ref 70–130)

## 2022-04-18 PROCEDURE — 25010000002 PROPOFOL 200 MG/20ML EMULSION: Performed by: NURSE ANESTHETIST, CERTIFIED REGISTERED

## 2022-04-18 PROCEDURE — V2632 POST CHMBR INTRAOCULAR LENS: HCPCS | Performed by: OPHTHALMOLOGY

## 2022-04-18 PROCEDURE — 82962 GLUCOSE BLOOD TEST: CPT

## 2022-04-18 DEVICE — LENS ACRYSOF IQ SA60WF W/ULTRASERT 6X13MM ACU0T0 15.5: Type: IMPLANTABLE DEVICE | Site: POSTERIOR CHAMBER | Status: FUNCTIONAL

## 2022-04-18 RX ORDER — DIFLUPREDNATE OPHTHALMIC 0.5 MG/ML
1 EMULSION OPHTHALMIC SEE ADMIN INSTRUCTIONS
Status: DISCONTINUED | OUTPATIENT
Start: 2022-04-18 | End: 2022-04-18 | Stop reason: HOSPADM

## 2022-04-18 RX ORDER — ACETAZOLAMIDE 500 MG/1
500 CAPSULE, EXTENDED RELEASE ORAL ONCE
Status: COMPLETED | OUTPATIENT
Start: 2022-04-18 | End: 2022-04-18

## 2022-04-18 RX ORDER — BALANCED SALT SOLUTION 6.4; .75; .48; .3; 3.9; 1.7 MG/ML; MG/ML; MG/ML; MG/ML; MG/ML; MG/ML
SOLUTION OPHTHALMIC AS NEEDED
Status: DISCONTINUED | OUTPATIENT
Start: 2022-04-18 | End: 2022-04-18 | Stop reason: HOSPADM

## 2022-04-18 RX ORDER — NEOMYCIN SULFATE, POLYMYXIN B SULFATE, AND DEXAMETHASONE 3.5; 10000; 1 MG/G; [USP'U]/G; MG/G
OINTMENT OPHTHALMIC AS NEEDED
Status: DISCONTINUED | OUTPATIENT
Start: 2022-04-18 | End: 2022-04-18 | Stop reason: HOSPADM

## 2022-04-18 RX ORDER — KETAMINE HCL IN NACL, ISO-OSM 100MG/10ML
SYRINGE (ML) INJECTION AS NEEDED
Status: DISCONTINUED | OUTPATIENT
Start: 2022-04-18 | End: 2022-04-18 | Stop reason: SURG

## 2022-04-18 RX ORDER — CYCLOPENT/TROPIC/PHEN/KETR/WAT 1%-1%-2.5%
1 DROPS (EA) OPHTHALMIC (EYE)
Status: COMPLETED | OUTPATIENT
Start: 2022-04-18 | End: 2022-04-18

## 2022-04-18 RX ORDER — SODIUM CHLORIDE 0.9 % (FLUSH) 0.9 %
10 SYRINGE (ML) INJECTION EVERY 12 HOURS SCHEDULED
Status: DISCONTINUED | OUTPATIENT
Start: 2022-04-18 | End: 2022-04-18 | Stop reason: HOSPADM

## 2022-04-18 RX ORDER — LIDOCAINE HYDROCHLORIDE 40 MG/ML
INJECTION, SOLUTION RETROBULBAR; TOPICAL AS NEEDED
Status: DISCONTINUED | OUTPATIENT
Start: 2022-04-18 | End: 2022-04-18 | Stop reason: HOSPADM

## 2022-04-18 RX ORDER — TETRACAINE HYDROCHLORIDE 5 MG/ML
1 SOLUTION OPHTHALMIC SEE ADMIN INSTRUCTIONS
Status: COMPLETED | OUTPATIENT
Start: 2022-04-18 | End: 2022-04-18

## 2022-04-18 RX ORDER — LIDOCAINE HCL/PF 100 MG/5ML
SYRINGE (ML) INJECTION AS NEEDED
Status: DISCONTINUED | OUTPATIENT
Start: 2022-04-18 | End: 2022-04-18 | Stop reason: SURG

## 2022-04-18 RX ORDER — SODIUM CHLORIDE 0.9 % (FLUSH) 0.9 %
1-10 SYRINGE (ML) INJECTION AS NEEDED
Status: DISCONTINUED | OUTPATIENT
Start: 2022-04-18 | End: 2022-04-18 | Stop reason: HOSPADM

## 2022-04-18 RX ORDER — SODIUM CHLORIDE, SODIUM LACTATE, POTASSIUM CHLORIDE, CALCIUM CHLORIDE 600; 310; 30; 20 MG/100ML; MG/100ML; MG/100ML; MG/100ML
1000 INJECTION, SOLUTION INTRAVENOUS CONTINUOUS
Status: DISCONTINUED | OUTPATIENT
Start: 2022-04-18 | End: 2022-04-18 | Stop reason: HOSPADM

## 2022-04-18 RX ORDER — TETRACAINE HYDROCHLORIDE 5 MG/ML
SOLUTION OPHTHALMIC AS NEEDED
Status: DISCONTINUED | OUTPATIENT
Start: 2022-04-18 | End: 2022-04-18 | Stop reason: HOSPADM

## 2022-04-18 RX ORDER — DIFLUPREDNATE OPHTHALMIC 0.5 MG/ML
EMULSION OPHTHALMIC AS NEEDED
Status: DISCONTINUED | OUTPATIENT
Start: 2022-04-18 | End: 2022-04-18 | Stop reason: HOSPADM

## 2022-04-18 RX ORDER — PROPOFOL 10 MG/ML
INJECTION, EMULSION INTRAVENOUS AS NEEDED
Status: DISCONTINUED | OUTPATIENT
Start: 2022-04-18 | End: 2022-04-18 | Stop reason: SURG

## 2022-04-18 RX ADMIN — PROPOFOL 50 MG: 10 INJECTION, EMULSION INTRAVENOUS at 08:00

## 2022-04-18 RX ADMIN — Medication 50 MG: at 08:00

## 2022-04-18 RX ADMIN — Medication 1 DROP: at 06:37

## 2022-04-18 RX ADMIN — Medication 20 MG: at 08:00

## 2022-04-18 RX ADMIN — ACETAZOLAMIDE 500 MG: 500 CAPSULE, EXTENDED RELEASE ORAL at 08:26

## 2022-04-18 RX ADMIN — SODIUM CHLORIDE, POTASSIUM CHLORIDE, SODIUM LACTATE AND CALCIUM CHLORIDE 1000 ML: 600; 310; 30; 20 INJECTION, SOLUTION INTRAVENOUS at 06:43

## 2022-04-18 RX ADMIN — Medication 1 DROP: at 06:42

## 2022-04-18 RX ADMIN — Medication 1 DROP: at 06:32

## 2022-04-18 RX ADMIN — TETRACAINE HYDROCHLORIDE 1 DROP: 5 SOLUTION OPHTHALMIC at 06:28

## 2022-04-18 RX ADMIN — TETRACAINE HYDROCHLORIDE 1 DROP: 5 SOLUTION OPHTHALMIC at 06:30

## 2022-04-18 NOTE — OP NOTE
OPERATIVE NOTE    Date of Procedure: 4/18/2022  Patient Name: Miguelangel Lin  Patient MRN: [id number]  YOB: 1950     Preoperative Diagnosis: Right nuclear sclerotic cataract.     Postoperative Diagnosis: Right nuclear sclerotic cataract.     Procedure Performed: Phacoemulsification with implantation of a  foldable posterior chamber intraocular lens, Right eye.     Surgeon: Khang Kulkarni MD     Anesthesia:  Monitored Anesthesia Care (MAC)      Brief History and Indication: The patient presents with a history of past progressive loss of vision.  Patient was diagnosed with cataract and requests removal for increased ability to read and see.     Operation Description: The patient was taken to the OR and prepped and draped in the usual sterile ophthalmic fashion. A lid speculum was placed in the eye.  A #75 blade was then used to make a stab incision two o’clock hours from the intended temporal clear cornea groove. The anterior chamber was then inflated with a Viscoelastic. A metal microkeratome blade was then used to enter the anterior chamber at the temporal clear cornea site. A three level tunnel incision was made. A curvilinear capsulorrhexis was then performed with a bent cystotome needle and capsulorrhexis forceps.  BSS on a 30 gauge bent cannula was used to hydro-dissect, and hydro-delineate the lens. Good fluid waves and hydro-delineation were noted. Phacoemulsification was then used to remove nuclear material without complications. The residual cortical and lenticular material was then removed with irrigation and aspiration. Viscoelastics were then used to inflate the bag in a soft shell technique. A PCIOL was injected into the bag. Post-implantation, there were no rents or tears in the bag and the lens was noted to be stable and centered. The residual Viscoelastic was then removed with irrigation and aspiration.  The wound was checked and found to be without leaks. Therefore a Polydex ointment  and one drop of Durezol eye drop was placed in the eye.     Implant Information:   Implant Name Type Inv. Item Serial No.  Lot No. LRB No. Used Action   LENS ACRYSOF IQ SA60WF W/ULTRASERT 6X13MM ACU0T0 15.5 - [id number] - [id number] Implant LENS ACRYSOF IQ SA60WF W/ULTRASERT 6X13MM ACU0T0 15.5 [id number] [id number]  Right 1 Implanted       Complications: None    Estimated Blood Loss:  Less than 1 cc.       []   Changed to complex procedure due to: []  hypermature, white cataract. Blue dye was used. []   small iris. A Malyugin Ring was used.    Discharge and Condition  The patient was transported to same day surgery in excellent condition and scheduled for follow-up tomorrow morning. The patient was given instructions on use of eye drops for the operative eye and was specifically instructed to call Dr. Kulkarni at his office or home for any nausea, vomiting, headache, decreased visual acuity, or pain, or if the patient had any general concerns.    Khang Kulkarni MD  4/18/2022

## 2022-04-18 NOTE — H&P
"      [facility]         History and Physical    Patient Name: Miguelangel Lin  MRN: [id number]  : 1950  Gender: male     HPI: Patient complaint of PPLOV Right eye diagnosed with cataract. Patient requests PHACO PCIOL for Increase of VA/ADL.    History:    Past Medical History:   Diagnosis Date   • Arthritis    • Cataracts, bilateral     s/p surgery on the left   • Chest pain     has stress test and pt states was WNL   • COVID-19 vaccine series completed     Moderna, plus booster   • Diabetes mellitus ([facility])    • Enlarged prostate    • GERD (gastroesophageal reflux disease)    • [place] (hard of hearing)     bilateral hearing aids   • Hx of exercise stress test     \"it was fine\"   • Hypertension    • MI (myocardial infarction) (Formerly [facility])     Saw Dr. Wright.  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    • Seasonal allergies    • Wears glasses        Past Surgical History:   Procedure Laterality Date   • CATARACT EXTRACTION W/ INTRAOCULAR LENS IMPLANT Left 2022    Procedure: CATARACT PHACO EXTRACTION WITH INTRAOCULAR LENS IMPLANT LEFT;  Surgeon: Khang Kulkarni MD;  Location: [facility] OR;  Service: Ophthalmology;  Laterality: Left;   • COLONOSCOPY     • COLONOSCOPY N/A 2020    Procedure: COLONOSCOPY, polypectomies;  Surgeon: Victoriano Lerma MD;  Location: [facility] ENDOSCOPY;  Service: Gastroenterology;  Laterality: N/A;   • ENDOSCOPY     • HERNIA REPAIR      x2   • SKIN BIOPSY      right hand--pre-cancerous   • TONSILLECTOMY         Social History     Socioeconomic History   • Marital status: Unknown   Tobacco Use   • Smoking status: Former Smoker     Years: 25.00     Types: Pipe     Quit date: 6/15/2006     Years since quitting: 15.8   • Smokeless tobacco: Never Used   Vaping Use   • Vaping Use: Never used   Substance and " Sexual Activity   • Alcohol use: No   • Drug use: No   • Sexual activity: Defer       Family History   Problem Relation Age of Onset   • No Known Problems Mother    • No Known Problems Father    • No Known Problems Sister    • No Known Problems Brother        Prior to Admission Medications:  Medications Prior to Admission   Medication Sig Dispense Refill Last Dose   • Apoaequorin (PREVAGEN PO) Take 1 tablet by mouth Daily.   4/17/2022 at 0900   • Ascorbic Acid (VITAMIN C PO) Take 500 mg by mouth Daily.   4/17/2022 at 0900   • aspirin 81 MG EC tablet Take 81 mg by mouth 2 (Two) Times a Day.   4/17/2022 at 0900   • atorvastatin (LIPITOR) 40 MG tablet Take 40 mg by mouth Daily.  0 4/17/2022 at 0900   • Calcium Carbonate Antacid (TUMS PO) Take 2 tablets by mouth Take As Directed.   4/17/2022 at 0900   • empagliflozin (JARDIANCE) 25 MG tablet tablet Take 25 mg by mouth Daily.   4/17/2022 at 0900   • folic acid (FOLVITE) 400 MCG tablet Take 400 mcg by mouth Daily.   4/17/2022 at 0900   • glipizide (GLUCOTROL XL) 5 MG ER tablet Take 2.5 mg by mouth 2 (two) times a day.  0 4/17/2022 at 0900   • ipratropium (ATROVENT) 0.06 % nasal spray 2 sprays into the nostril(s) as directed by provider 2 (two) times a day.   4/17/2022 at 0900   • lisinopril (PRINIVIL,ZESTRIL) 20 MG tablet Take 20 mg by mouth Daily.  0 4/17/2022 at 0900   • loratadine (CLARITIN) 10 MG tablet Take 10 mg by mouth Daily.   4/17/2022 at 0900   • metFORMIN (GLUCOPHAGE) 1000 MG tablet Take 1,000 mg by mouth 2 (Two) Times a Day With Meals.  0 4/17/2022 at 0900   • Naproxen Sod-diphenhydrAMINE (ALEVE PM PO) Take 1 tablet by mouth At Night As Needed.   4/17/2022 at 0900   • Omega-3 Fatty Acids (FISH OIL) 1200 MG capsule capsule Take 1,200 mg by mouth Daily.   4/17/2022 at 0900   • omeprazole (priLOSEC) 20 MG capsule Take 20 mg by mouth Daily.   4/17/2022 at 0900   • psyllium (METAMUCIL) 58.6 % packet Take 1 packet by mouth Daily As Needed.   4/17/2022 at 0900   •  "SAXagliptin (ONGLYZA) 5 MG tablet Take 5 mg by mouth Daily.   4/17/2022 at 0900   • vitamin B-12 (CYANOCOBALAMIN) 1000 MCG tablet Take 1,000 mcg by mouth Daily.   4/17/2022 at 0900   • bisacodyl (DULCOLAX) 5 MG EC tablet Take 2 @ 3pm, 2 @ 7 pm day prior to colonoscopy 4 tablet 0    • Calcium Carbonate-Vit D-Min (CALCIUM 1200 PO) Take 1 tablet by mouth Daily. (Patient not taking: No sig reported)   Not Taking at Unknown time   • RA LORATA-D  MG per 24 hr tablet Take 1 tablet by mouth Daily.  0        Allergies:  Allergies   Allergen Reactions   • Doxycycline Other (See Comments)     \"tingling\", feverish, headache        Vitals: Temp:  [97.7 °F (36.5 °C)] 97.7 °F (36.5 °C)  Heart Rate:  [87] 87  Resp:  [18] 18  BP: (103)/(66) 103/66    Review of Systems:   Within Normal Limits Abnormal   HEENT [x]    []     Cardiovascular [x]   []     Gastrointestinal [x]   []     Genitourinary [x]   []     Neurologic [x]   []     Pulmonary [x]   []       Physical Exam:   Within Normal Limits Abnormal   HEENT [x]    []     Heart [x]   []     Lungs [x]   []     Abdomen [x]   []     Extremities [x]   []       Impression: Right nuclear sclerotic cataract.     Plan: CATARACT PHACO EXTRACTION WITH INTRAOCULAR LENS IMPLANT RIGHT (Right)     Khang Kulkarni MD  4/18/2022    "

## 2022-04-18 NOTE — DISCHARGE INSTRUCTIONS
Post Operative Cataract Instructions     Right Eye  POST OPERATIVE INSTRUCTIONS  You have received anesthesia today. DO NOT drive, drink alcohol, sign legal documents.   After surgery, your eye will not hurt. It may feel scratchy, sticky or uncomfortable. Your eye will be sensitive to light.  Most people see better 1-3 days after the procedure, but it could take 3 weeks to get the full benefits and reach your visual potential. If your vision is blurry for a few days it is normal, and means you may have swelling outside or inside the eye. For some patients, a bubble is placed and there will be blurriness.   You should receive a post-op kit with tape and an eye shield. Wear the shield for the first 3 nights after surgery to keep you from rubbing the eye.  You may wear glasses or sunglasses during the day.  You must keep eye protected at all times.  Most people are able to return to their normal routine 1-3 days after surgery, however, due to the brain adjusting to your new vision you may have trouble judging distances and want to be careful when driving and going up and downstairs.   You can shower and wash your hair the day after surgery. Keep water, shampoo, hair spray and shaving lotion out of the eye, especially for the first week.   If eyes become stuck together after surgery you may soak with warm cloth.  During the first week, you should AVOID:   Rubbing or putting pressure on your eye.  Eye make-up, face cream or lotion, hair coloring or perms  Strenuous activities, such as running or lifting weights, as to avoid sweat from getting in the eye. Avoid swimming, hot tubs, fumes or michelle conditions.   Sleeping on operative side.  Excessive sneezing or coughing.  Hanging the head down for periods of time. Keep your head above your heart.    Some discomfort and blurred vision after surgery are normal, but if you have any unusual pain, swelling, bleeding or sudden decrease in vision, contact our office immediately.      Emergency assistance is available at any time by calling:    Dr.Mark Shad Kulkarni  [phone]    If unable to reach call [facility] @ [phone]    You have been prescribed an eye drop to use after surgery, please follow these instructions and bring eye drops and instructions with you to post op appointment:    Difluprednate (DUREZOL) 0.05 %. Shake well before use.    USE 1 DROP 4 (FOUR) TIMES A DAY FOR 1 WEEK THEN STARTING WEEK 2, ONLY USE 2 (TWO) TIMES A DAY UNTIL YOU RUN OUT OF DROPS.     PLACE A AZRA IN THE DAY COLUMN EACH TIME YOU USE TO KEEP ON SCHEDULE    WEEK 1-USE 4  (FOUR) TIMES A DAY DAY 1  []   []    []   []     DAY 2  []   []    []   []  DAY 3  []   []    []   []  DAY 4  []   []    []   []  DAY 5  []   []    []   []  DAY 6  []   []    []   []  DAY 7  []   []    []   []      WEEK 2-USE 2 (TWO)  TIMES A DAY DAY 1  []   []  DAY 2  []   []  DAY 3  []   []  DAY 4  []   []  DAY 5  []   []  DAY 6  []   []  DAY 7  []   []      WEEK 3-USE 2 (TWO) TIMES A DAY DAY 1  []   []  DAY 2  []   []  DAY 3  []   []  DAY 4  []   []  DAY 5  []   []  DAY 6  []   []  DAY 7  []   []      WEEK 4-USE 2 (TWO)TIMES A DAY DAY 1  []   []  DAY 2  []   []  DAY 3  []   []  DAY 4  []   []  DAY 5  []   []  DAY 6  []   []  DAY 7  []   []

## 2022-04-18 NOTE — ANESTHESIA POSTPROCEDURE EVALUATION
Patient: Miguelangel Lin    Procedure Summary     Date: 04/18/22 Room / Location: [facility] OR 1 /  [facility]    Anesthesia Start: 0756 Anesthesia Stop: 0812    Procedure: CATARACT PHACO EXTRACTION WITH INTRAOCULAR LENS IMPLANT RIGHT (Right Eye) Diagnosis:       Nuclear sclerotic cataract of right eye      (Nuclear sclerotic cataract of right eye [H25.11])    Surgeons: Khang Kulkarni MD Provider: Gloria Diaz CRNA    Anesthesia Type: MAC ASA Status: 3          Anesthesia Type: MAC    Vitals  Vitals Value Taken Time   /72 04/18/22 0838   Temp 97.6 °F (36.4 °C) 04/18/22 0817   Pulse 85 04/18/22 0838   Resp 18 04/18/22 0838   SpO2 96 % 04/18/22 0838           Post Anesthesia Care and Evaluation    Patient location during evaluation: PHASE II  Patient participation: complete - patient participated  Level of consciousness: awake and alert  Pain score: 0  Pain management: satisfactory to patient  Airway patency: patent  Anesthetic complications: No anesthetic complications  PONV Status: none  Cardiovascular status: acceptable and stable  Respiratory status: acceptable  Hydration status: acceptable

## 2022-04-18 NOTE — ANESTHESIA PREPROCEDURE EVALUATION
Anesthesia Evaluation     Patient summary reviewed and Nursing notes reviewed   no history of anesthetic complications:  NPO Solid Status: > 8 hours  NPO Liquid Status: > 8 hours           Airway   Mallampati: II  TM distance: >3 FB  Neck ROM: full  Possible difficult intubation  Dental - normal exam     Pulmonary    (+) a smoker Former, COPD, shortness of breath, sleep apnea, decreased breath sounds,     ROS comment: QUIT smoking 2006  Cardiovascular - normal exam  Exercise tolerance: good (4-7 METS)    PT is on anticoagulation therapy    (+) hypertension well controlled less than 2 medications, past MI , CAD ( inc risk ), angina, ANTONY, PVD,     ROS comment: ASA 81 mg     Neuro/Psych- negative ROS  GI/Hepatic/Renal/Endo    (+)  GERD well controlled,  diabetes mellitus type 2,     Musculoskeletal     (+) arthralgias, back pain, chronic pain, myalgias,   Abdominal   (+) obese,    Substance History - negative use  (-) alcohol use, drug use     OB/GYN negative ob/gyn ROS         Other   arthritis, blood dyscrasia,   history of cancer    ROS/Med Hx Other: Polycythemia  [place] - uses bilateral hearing assistance  Hx of MI - Sees Dr Wright                    Anesthesia Plan    ASA 3     MAC   (Patient advised that intravenous sedation would be utilized as primary anesthetic technique. Every effort will be made to make sure patient is comfortable. Patient advised that they may experience recall of events of the procedure. Patient verbalized understanding and agreed to plan. )  intravenous induction     Anesthetic plan, all risks, benefits, and alternatives have been provided, discussed and informed consent has been obtained with: patient.    Plan discussed with CRNA.

## 2023-05-26 ENCOUNTER — TELEPHONE (OUTPATIENT)
Dept: SURGERY | Facility: CLINIC | Age: 73
End: 2023-05-26
Payer: MEDICARE

## 2023-05-26 NOTE — TELEPHONE ENCOUNTER
Patient on  3 year recall for colonoscopy. Called and spoke with patient schedule appointment with Dr Lerma 06/14/ 23

## 2023-06-12 NOTE — PROGRESS NOTES
"Patient: Miguelangel Lin    YOB: 1950    Date: 06/14/2023    Primary Care Provider: Belinda Bullock MD    Chief Complaint   Patient presents with    Polyp of colon, unspecified part of colon, unspecified type    Colonoscopy evaluation    Follow-up       SUBJECTIVE:    History of present illness:  Patient has a history significant for CAD and he has a history of tubular adenoma colon polyps.  I saw the patient in the office today as a consultation for a 3 year colonoscopy recall.  Patient's last colonoscopy was performed 06/17/2020 at which time he had a  tubular adenoma removed from his hepatic flexure. Patient states he has been experiencing some abdominal pain. He states he has been experiencing diarrhea, but denies constipation. Patient states he has experienced some nausea, but denies vomiting. Patient denies pain and rectal bleeding with bowel movement. He denies family history of colon cancer.     The patient has no further history at this time.    The following portions of the patient's history were reviewed and updated as appropriate: allergies, current medications, past family history, past medical history, past social history, past surgical history and problem list.    Review of Systems   Gastrointestinal:  Positive for diarrhea and nausea.     History:  Past Medical History:   Diagnosis Date    Arthritis     Cataracts, bilateral     s/p surgery on the left    Chest pain 2008    has stress test and pt states was WNL    COVID-19 vaccine series completed     Moderna, plus booster    Diabetes mellitus     Enlarged prostate     GERD (gastroesophageal reflux disease)     [place] (hard of hearing)     bilateral hearing aids    Hx of exercise stress test 2008    \"it was fine\"    Hypertension     MI (myocardial infarction) 2000    Saw Dr. Wright.  states no intervention (Pt states that he didn't know he had it, that an EKG detected it later)    Polycythemia vera     Positive TB test     age 19.  " "received treatment for this.     Seasonal allergies     Wears glasses        Past Surgical History:   Procedure Laterality Date    CATARACT EXTRACTION W/ INTRAOCULAR LENS IMPLANT Left 2022    Procedure: CATARACT PHACO EXTRACTION WITH INTRAOCULAR LENS IMPLANT LEFT;  Surgeon: Khnag Kulkarni MD;  Location: [facility] OR;  Service: Ophthalmology;  Laterality: Left;    CATARACT EXTRACTION W/ INTRAOCULAR LENS IMPLANT Right 2022    Procedure: CATARACT PHACO EXTRACTION WITH INTRAOCULAR LENS IMPLANT RIGHT;  Surgeon: Khang Kulkarni MD;  Location: [facility] OR;  Service: Ophthalmology;  Laterality: Right;    COLONOSCOPY      COLONOSCOPY N/A 2020    Procedure: COLONOSCOPY, polypectomies;  Surgeon: Victoriano Lerma MD;  Location: [facility] ENDOSCOPY;  Service: Gastroenterology;  Laterality: N/A;    ENDOSCOPY      HERNIA REPAIR      x2    SKIN BIOPSY      right hand--pre-cancerous    TONSILLECTOMY         Family History   Problem Relation Age of Onset    No Known Problems Mother     No Known Problems Father     No Known Problems Sister     No Known Problems Brother        Social History     Tobacco Use    Smoking status: Former     Types: Pipe     Quit date: 6/15/2006     Years since quittin.0    Smokeless tobacco: Never   Vaping Use    Vaping Use: Never used   Substance Use Topics    Alcohol use: No    Drug use: No       Allergies:  Allergies   Allergen Reactions    Doxycycline Other (See Comments)     \"tingling\", feverish, headache       Medications:    Current Outpatient Medications:     Alendronate-Cholecalciferol (FOSAMAX PLUS D PO), alendronate-vitamin D3, Disp: , Rfl:     Ascorbic Acid (VITAMIN C PO), Take 500 mg by mouth Daily., Disp: , Rfl:     atorvastatin (LIPITOR) 40 MG tablet, Take 1 tablet by mouth Daily., Disp: , Rfl: 0    calcium carbonate (OS-JOE) 1250 (500 Ca) MG chewable tablet, Calcium 500, Disp: , Rfl:     Calcium Carbonate Antacid (TUMS PO), Take 2 tablets by mouth Take As " Directed., Disp: , Rfl:     empagliflozin (JARDIANCE) 25 MG tablet tablet, Take 1 tablet by mouth Daily., Disp: , Rfl:     folic acid (FOLVITE) 400 MCG tablet, Take 1 tablet by mouth Daily., Disp: , Rfl:     glipizide (GLUCOTROL XL) 5 MG ER tablet, Take 2.5 mg by mouth 2 (two) times a day., Disp: , Rfl: 0    ipratropium (ATROVENT) 0.06 % nasal spray, 2 sprays into the nostril(s) as directed by provider 2 (two) times a day., Disp: , Rfl:     lisinopril (PRINIVIL,ZESTRIL) 20 MG tablet, Take 1 tablet by mouth Daily., Disp: , Rfl: 0    loratadine (CLARITIN) 10 MG tablet, Take 1 tablet by mouth Daily., Disp: , Rfl:     metFORMIN (GLUCOPHAGE) 1000 MG tablet, Take 1 tablet by mouth 2 (Two) Times a Day With Meals., Disp: , Rfl: 0    Naproxen Sod-diphenhydrAMINE (ALEVE PM PO), Take 1 tablet by mouth At Night As Needed., Disp: , Rfl:     Omega-3 Fatty Acids (FISH OIL) 1200 MG capsule capsule, Take 1 capsule by mouth Daily., Disp: , Rfl:     omeprazole (priLOSEC) 20 MG capsule, Take 1 capsule by mouth Daily., Disp: , Rfl:     Ozempic, 2 MG/DOSE, 8 MG/3ML solution pen-injector, INJECT 2 MG UNDER THE SKIN WEEKLY, Disp: , Rfl:     vitamin B-12 (CYANOCOBALAMIN) 1000 MCG tablet, Take 1 tablet by mouth Daily., Disp: , Rfl:     Apoaequorin (PREVAGEN PO), Take 1 tablet by mouth Daily. (Patient not taking: Reported on 6/14/2023), Disp: , Rfl:     aspirin 81 MG EC tablet, Take 81 mg by mouth 2 (Two) Times a Day. (Patient not taking: Reported on 6/14/2023), Disp: , Rfl:     bisacodyl (DULCOLAX) 5 MG EC tablet, Take 2 @ 3pm, 2 @ 7 pm day prior to colonoscopy (Patient not taking: Reported on 6/14/2023), Disp: 4 tablet, Rfl: 0    psyllium (METAMUCIL) 58.6 % packet, Take 1 packet by mouth Daily As Needed. (Patient not taking: Reported on 6/14/2023), Disp: , Rfl:     SAXagliptin (ONGLYZA) 5 MG tablet, Take 5 mg by mouth Daily. (Patient not taking: Reported on 6/14/2023), Disp: , Rfl:     OBJECTIVE:    Vital Signs:   Vitals:    06/14/23 1332  "  BP: 116/78   BP Location: Right arm   Patient Position: Sitting   Cuff Size: Adult   Pulse: 108   Temp: 97.1 °F (36.2 °C)   TempSrc: Temporal   SpO2: 96%   Weight: 85.8 kg (189 lb 3.2 oz)   Height: 176.5 cm (69.5\")       Physical Exam:   General Appearance:    Alert, cooperative, in no acute distress   Head:    Normocephalic, without obvious abnormality, atraumatic   Eyes:            Lids and lashes normal, conjunctivae and sclerae normal, no   icterus, no pallor, corneas clear, PERRL   Ears:    Ears appear intact with no abnormalities noted   Throat:   No oral lesions, no thrush, oral mucosa moist   Neck:   No adenopathy, supple, trachea midline, no thyromegaly,  no JVD   Lungs:     Clear to auscultation,respirations regular, even and                  unlabored    Heart:    Regular rhythm and normal rate, normal S1 and S2, no            murmur   Abdomen:     no masses, no organomegaly, soft non-tender, non-distended, no guarding, there is no evidence of tenderness, no peritoneal signs   Extremities:   Moves all extremities well, no edema, no cyanosis, no             redness   Pulses:   Pulses palpable and equal bilaterally   Skin:   No bleeding, bruising or rash   Lymph nodes:   No palpable adenopathy   Neurologic:   Cranial nerves 2 - 12 grossly intact, sensation intact      Results Review:   I reviewed the patient's new clinical results.  I reviewed the patient's new imaging results and agree with the interpretation.  I reviewed the patient's other test results and agree with the interpretation    Review of Systems was reviewed and confirmed as accurate as documented by the MA.    ASSESSMENT/PLAN:    1. Adenomatous polyp of transverse colon    2. Adenomatous polyp of colon, unspecified part of colon        I recommend a colonoscopy for further evaluation. The procedure was explained as well as the risks which include but are not limited to bleeding, infection, perforation, abdominal pain etc. The patient " understands these risks and the procedure and wishes to proceed.     Electronically signed by Victoriano Lerma MD  06/14/23 15:09 EDT

## 2023-06-14 ENCOUNTER — OFFICE VISIT (OUTPATIENT)
Dept: SURGERY | Facility: CLINIC | Age: 73
End: 2023-06-14
Payer: MEDICARE

## 2023-06-14 VITALS
SYSTOLIC BLOOD PRESSURE: 116 MMHG | BODY MASS INDEX: 27.09 KG/M2 | TEMPERATURE: 97.1 F | HEART RATE: 108 BPM | OXYGEN SATURATION: 96 % | WEIGHT: 189.2 LBS | HEIGHT: 70 IN | DIASTOLIC BLOOD PRESSURE: 78 MMHG

## 2023-06-14 DIAGNOSIS — D12.6 ADENOMATOUS POLYP OF COLON, UNSPECIFIED PART OF COLON: ICD-10-CM

## 2023-06-14 DIAGNOSIS — D12.3 ADENOMATOUS POLYP OF TRANSVERSE COLON: Primary | ICD-10-CM

## 2023-06-14 RX ORDER — POLYETHYLENE GLYCOL 3350 17 G/17G
238 POWDER, FOR SOLUTION ORAL ONCE
Qty: 238 G | Refills: 0 | Status: SHIPPED | OUTPATIENT
Start: 2023-06-14 | End: 2023-06-14

## 2023-06-14 RX ORDER — BISACODYL 5 MG/1
TABLET, DELAYED RELEASE ORAL
Qty: 4 TABLET | Refills: 0 | Status: SHIPPED | OUTPATIENT
Start: 2023-06-14

## 2023-06-14 RX ORDER — UREA 10 %
LOTION (ML) TOPICAL
COMMUNITY

## 2023-06-14 RX ORDER — SEMAGLUTIDE 2.68 MG/ML
INJECTION, SOLUTION SUBCUTANEOUS
COMMUNITY
Start: 2023-03-20

## (undated) DEVICE — PAD GRND REM POLYHESIVE A/ DISP

## (undated) DEVICE — CLEARCUT® HP2 SLIT KNIFE INTREPID MICRO-COAXIAL SYSTEM 2.4 DB: Brand: CLEARCUT®; INTREPID

## (undated) DEVICE — 15 DEG. MICROKNIFE - 3MM: Brand: SHARPOINT

## (undated) DEVICE — Device

## (undated) DEVICE — ENDOSCOPY PORT CONNECTOR FOR OLYMPUS® SCOPES: Brand: ERBE

## (undated) DEVICE — HYBRID TUBING/CAP SET FOR OLYMPUS® SCOPES: Brand: ERBE

## (undated) DEVICE — SYR LUERLOK 5CC

## (undated) DEVICE — SOL IRRIG H2O 1000ML STRL

## (undated) DEVICE — HP CONCL INTREPID COAX I/A CRV .3MM

## (undated) DEVICE — GLV SURG NEOPRN SENSICARE SZ8

## (undated) DEVICE — LUBE JELLY PK/2.75GM STRL BX/144

## (undated) DEVICE — EYE CARE POST OP KIT: Brand: MEDLINE INDUSTRIES, INC.

## (undated) DEVICE — VLV SXN AIR/H2O ORCAPOD3 1P/U STRL

## (undated) DEVICE — CANN IRR/INJ AIR ANT CHAMBER 6MM BEND 27G

## (undated) DEVICE — FRCP BIOP RADLJAW4 HOT 2.2X240 BX40

## (undated) DEVICE — GLV SURG SENSICARE W/ALOE PF LF 8.5 STRL